# Patient Record
Sex: MALE | Race: OTHER | Employment: PART TIME | URBAN - METROPOLITAN AREA
[De-identification: names, ages, dates, MRNs, and addresses within clinical notes are randomized per-mention and may not be internally consistent; named-entity substitution may affect disease eponyms.]

---

## 2017-06-17 ENCOUNTER — HOSPITAL ENCOUNTER (EMERGENCY)
Age: 22
Discharge: HOME | End: 2017-06-17
Attending: EMERGENCY MEDICINE

## 2017-06-17 VITALS
RESPIRATION RATE: 18 BRPM | HEART RATE: 93 BPM | TEMPERATURE: 99.8 F | DIASTOLIC BLOOD PRESSURE: 66 MMHG | SYSTOLIC BLOOD PRESSURE: 113 MMHG | WEIGHT: 140 LBS | BODY MASS INDEX: 22.5 KG/M2 | OXYGEN SATURATION: 98 % | HEIGHT: 66 IN

## 2017-06-17 DIAGNOSIS — J01.10 ACUTE FRONTAL SINUSITIS, RECURRENCE NOT SPECIFIED: Primary | ICD-10-CM

## 2017-06-17 LAB
ALBUMIN SERPL-MCNC: 4.6 G/DL (ref 3.9–4.9)
ALP BLD-CCNC: 69 U/L (ref 35–104)
ALT SERPL-CCNC: 45 U/L (ref 0–41)
ANION GAP SERPL CALCULATED.3IONS-SCNC: 12 MEQ/L (ref 7–13)
AST SERPL-CCNC: 34 U/L (ref 0–40)
BASOPHILS ABSOLUTE: 0 K/UL (ref 0–0.2)
BASOPHILS RELATIVE PERCENT: 0.6 %
BILIRUB SERPL-MCNC: 0.4 MG/DL (ref 0–1.2)
BUN BLDV-MCNC: 14 MG/DL (ref 6–20)
CALCIUM SERPL-MCNC: 9 MG/DL (ref 8.6–10.2)
CHLORIDE BLD-SCNC: 102 MEQ/L (ref 98–107)
CO2: 26 MEQ/L (ref 22–29)
CREAT SERPL-MCNC: 1.03 MG/DL (ref 0.7–1.2)
EOSINOPHILS ABSOLUTE: 0.1 K/UL (ref 0–0.7)
EOSINOPHILS RELATIVE PERCENT: 1.7 %
GFR AFRICAN AMERICAN: >60
GFR NON-AFRICAN AMERICAN: >60
GLOBULIN: 2.5 G/DL (ref 2.3–3.5)
GLUCOSE BLD-MCNC: 97 MG/DL (ref 74–109)
HCT VFR BLD CALC: 44.2 % (ref 42–52)
HEMOGLOBIN: 14.6 G/DL (ref 14–18)
LYMPHOCYTES ABSOLUTE: 1.9 K/UL (ref 1–4.8)
LYMPHOCYTES RELATIVE PERCENT: 30.1 %
MCH RBC QN AUTO: 26.2 PG (ref 27–31.3)
MCHC RBC AUTO-ENTMCNC: 33 % (ref 33–37)
MCV RBC AUTO: 79.5 FL (ref 80–100)
MONO TEST: NEGATIVE
MONOCYTES ABSOLUTE: 0.8 K/UL (ref 0.2–0.8)
MONOCYTES RELATIVE PERCENT: 13.3 %
NEUTROPHILS ABSOLUTE: 3.4 K/UL (ref 1.4–6.5)
NEUTROPHILS RELATIVE PERCENT: 54.3 %
PDW BLD-RTO: 13.8 % (ref 11.5–14.5)
PLATELET # BLD: 198 K/UL (ref 130–400)
POTASSIUM SERPL-SCNC: 3.9 MEQ/L (ref 3.5–5.1)
RAPID INFLUENZA  B AGN: NEGATIVE
RAPID INFLUENZA A AGN: NEGATIVE
RBC # BLD: 5.57 M/UL (ref 4.7–6.1)
S PYO AG THROAT QL: NEGATIVE
SODIUM BLD-SCNC: 140 MEQ/L (ref 132–144)
TOTAL PROTEIN: 7.1 G/DL (ref 6.4–8.1)
WBC # BLD: 6.3 K/UL (ref 4.8–10.8)

## 2017-06-17 PROCEDURE — 36415 COLL VENOUS BLD VENIPUNCTURE: CPT

## 2017-06-17 PROCEDURE — 86308 HETEROPHILE ANTIBODY SCREEN: CPT

## 2017-06-17 PROCEDURE — 87040 BLOOD CULTURE FOR BACTERIA: CPT

## 2017-06-17 PROCEDURE — 87880 STREP A ASSAY W/OPTIC: CPT

## 2017-06-17 PROCEDURE — 87081 CULTURE SCREEN ONLY: CPT

## 2017-06-17 PROCEDURE — 2580000003 HC RX 258: Performed by: EMERGENCY MEDICINE

## 2017-06-17 PROCEDURE — 96375 TX/PRO/DX INJ NEW DRUG ADDON: CPT

## 2017-06-17 PROCEDURE — 99284 EMERGENCY DEPT VISIT MOD MDM: CPT

## 2017-06-17 PROCEDURE — 86403 PARTICLE AGGLUT ANTBDY SCRN: CPT

## 2017-06-17 PROCEDURE — 80053 COMPREHEN METABOLIC PANEL: CPT

## 2017-06-17 PROCEDURE — 6360000002 HC RX W HCPCS: Performed by: EMERGENCY MEDICINE

## 2017-06-17 PROCEDURE — 85025 COMPLETE CBC W/AUTO DIFF WBC: CPT

## 2017-06-17 PROCEDURE — 6370000000 HC RX 637 (ALT 250 FOR IP): Performed by: EMERGENCY MEDICINE

## 2017-06-17 PROCEDURE — 96374 THER/PROPH/DIAG INJ IV PUSH: CPT

## 2017-06-17 RX ORDER — 0.9 % SODIUM CHLORIDE 0.9 %
1000 INTRAVENOUS SOLUTION INTRAVENOUS ONCE
Status: COMPLETED | OUTPATIENT
Start: 2017-06-17 | End: 2017-06-17

## 2017-06-17 RX ORDER — KETOROLAC TROMETHAMINE 30 MG/ML
30 INJECTION, SOLUTION INTRAMUSCULAR; INTRAVENOUS ONCE
Status: COMPLETED | OUTPATIENT
Start: 2017-06-17 | End: 2017-06-17

## 2017-06-17 RX ORDER — AZITHROMYCIN 250 MG/1
TABLET, FILM COATED ORAL
Qty: 4 TABLET | Refills: 0 | Status: SHIPPED | OUTPATIENT
Start: 2017-06-17 | End: 2017-06-27

## 2017-06-17 RX ORDER — HYDROCODONE BITARTRATE AND ACETAMINOPHEN 5; 325 MG/1; MG/1
1 TABLET ORAL EVERY 6 HOURS PRN
Qty: 20 TABLET | Refills: 0 | Status: SHIPPED | OUTPATIENT
Start: 2017-06-17 | End: 2017-06-24

## 2017-06-17 RX ORDER — ACETAMINOPHEN 500 MG
1000 TABLET ORAL ONCE
Status: COMPLETED | OUTPATIENT
Start: 2017-06-17 | End: 2017-06-17

## 2017-06-17 RX ORDER — AZITHROMYCIN 500 MG/1
500 TABLET, FILM COATED ORAL ONCE
Status: COMPLETED | OUTPATIENT
Start: 2017-06-17 | End: 2017-06-17

## 2017-06-17 RX ORDER — ONDANSETRON 2 MG/ML
4 INJECTION INTRAMUSCULAR; INTRAVENOUS ONCE
Status: COMPLETED | OUTPATIENT
Start: 2017-06-17 | End: 2017-06-17

## 2017-06-17 RX ADMIN — AZITHROMYCIN 500 MG: 500 TABLET, FILM COATED ORAL at 22:04

## 2017-06-17 RX ADMIN — SODIUM CHLORIDE 1000 ML: 9 INJECTION, SOLUTION INTRAVENOUS at 20:57

## 2017-06-17 RX ADMIN — KETOROLAC TROMETHAMINE 30 MG: 30 INJECTION INTRAMUSCULAR; INTRAVENOUS at 20:57

## 2017-06-17 RX ADMIN — ACETAMINOPHEN 1000 MG: 500 TABLET ORAL at 20:57

## 2017-06-17 RX ADMIN — ONDANSETRON 4 MG: 2 INJECTION INTRAMUSCULAR; INTRAVENOUS at 20:57

## 2017-06-17 ASSESSMENT — ENCOUNTER SYMPTOMS
SORE THROAT: 0
PHOTOPHOBIA: 0
ABDOMINAL PAIN: 0
SINUS PRESSURE: 1
WHEEZING: 0
CHEST TIGHTNESS: 0
COUGH: 0
ABDOMINAL DISTENTION: 0
VOMITING: 0
SHORTNESS OF BREATH: 0
EYE DISCHARGE: 0

## 2017-06-17 ASSESSMENT — PAIN DESCRIPTION - LOCATION
LOCATION: HEAD
LOCATION: HEAD

## 2017-06-17 ASSESSMENT — PAIN DESCRIPTION - DESCRIPTORS
DESCRIPTORS: HEADACHE
DESCRIPTORS: ACHING

## 2017-06-17 ASSESSMENT — PAIN SCALES - GENERAL
PAINLEVEL_OUTOF10: 6
PAINLEVEL_OUTOF10: 5
PAINLEVEL_OUTOF10: 9
PAINLEVEL_OUTOF10: 9

## 2017-06-17 ASSESSMENT — PAIN DESCRIPTION - PAIN TYPE: TYPE: ACUTE PAIN

## 2017-06-20 LAB — S PYO THROAT QL CULT: NORMAL

## 2017-06-23 LAB
BLOOD CULTURE, ROUTINE: NORMAL
CULTURE, BLOOD 2: NORMAL

## 2018-02-13 ENCOUNTER — OFFICE VISIT (OUTPATIENT)
Dept: FAMILY MEDICINE CLINIC | Age: 23
End: 2018-02-13
Payer: COMMERCIAL

## 2018-02-13 VITALS
SYSTOLIC BLOOD PRESSURE: 110 MMHG | TEMPERATURE: 98.8 F | HEART RATE: 73 BPM | BODY MASS INDEX: 24.37 KG/M2 | OXYGEN SATURATION: 98 % | WEIGHT: 151 LBS | DIASTOLIC BLOOD PRESSURE: 80 MMHG

## 2018-02-13 DIAGNOSIS — B02.9 HERPES ZOSTER WITHOUT COMPLICATION: ICD-10-CM

## 2018-02-13 DIAGNOSIS — L30.9 ECZEMA, UNSPECIFIED TYPE: Primary | ICD-10-CM

## 2018-02-13 PROCEDURE — 1036F TOBACCO NON-USER: CPT | Performed by: NURSE PRACTITIONER

## 2018-02-13 PROCEDURE — G8484 FLU IMMUNIZE NO ADMIN: HCPCS | Performed by: NURSE PRACTITIONER

## 2018-02-13 PROCEDURE — 99213 OFFICE O/P EST LOW 20 MIN: CPT | Performed by: NURSE PRACTITIONER

## 2018-02-13 PROCEDURE — G8420 CALC BMI NORM PARAMETERS: HCPCS | Performed by: NURSE PRACTITIONER

## 2018-02-13 PROCEDURE — G8427 DOCREV CUR MEDS BY ELIG CLIN: HCPCS | Performed by: NURSE PRACTITIONER

## 2018-02-13 RX ORDER — TRIAMCINOLONE ACETONIDE 1 MG/G
CREAM TOPICAL
Qty: 45 G | Refills: 1 | Status: SHIPPED | OUTPATIENT
Start: 2018-02-13

## 2018-02-14 NOTE — PATIENT INSTRUCTIONS
Patient Education        Dermatitis: Instrucciones de cuidado - [ Dermatitis: Care Instructions ]  Instrucciones de cuidado  Dermatitis es el nombre general de cualquier salpullido o inflamación de la piel. Los distintos tipos de dermatitis causan diferentes tipos de salpullido. [address] causas comunes del salpullido se encuentran los medicamentos nuevos, las plantas (adama el zumaque venenoso o la hiedra venenosa), el calor y el estrés. Ciertas enfermedades también pueden causar un salpullido. Teri reacción alérgica a algo que toca la piel, adama el látex, el níquel o la hiedra venenosa, se llama dermatitis de contacto. La dermatitis de contacto también puede estar causada por algo que irrita la piel, adama la [place], teri sustancia química o el jabón. Bessie tipo de salpullido no se puede transmitir de Yarelis Bryce persona a otra. La duración del salpullido depende de schulte causa. El salpullido puede durar unos días o meses. La atención de seguimiento es teri parte clave de schulte tratamiento y seguridad. Asegúrese de hacer y acudir a todas las citas, y llame a schulte médico si está teniendo problemas. También es teri buena idea saber los resultados de shanell exámenes y mantener teri lista de los medicamentos que art. ¿Cómo puede cuidarse en el hogar? · No se rasque el salpullido. Mantenga las uñas cortas y [place]. O use guantes si esto le ayuda a no rascarse. · Lávese la key solo con agua. Seque la key con toques suaves de toalla. · Colóquese paños húmedos y fríos sobre el salpullido para reducir la comezón. · Manténgase fresco y evite el sol. · Deje el salpullido en contacto con el aire tanto adama sea posible. · Si el salpullido le da comezón, use crema de hidrocortisona. Siga las instrucciones de la etiqueta. La loción de calamina podría ayudar en el ibrahima del salpullido causado por plantas. · Tedrow un antihistamínico de venta [place], adama difenhidramina (Benadryl) o loratadina (Claritin), para aliviar la comezón.  Chika y [address] todas las indicaciones de la etiqueta. · Si schulte médico le recetó teri crema, úsela según las indicaciones. Si schulte médico le recetó un medicamento, tómelo exactamente según las indicaciones. ¿Cuándo debe pedir ayuda? Llame a schulte médico ahora mismo o busque atención médica inmediata si:  ? · Tiene síntomas de infección, tales adama:  ¨ Aumento del dolor, la hinchazón, la temperatura o el enrojecimiento. ¨ Vetas rojizas que salen de la key. ¨ Pus que sale de la key. Lucas Ralphs. ? · Tiene dolor en las articulaciones junto con el salpullido. ?Preste especial atención a los [facility], y asegúrese de comunicarse con schulte médico si:  ? · El salpullido está cambiando o empeorando. ? · No mejora adama se esperaba. ¿Dónde puede encontrar más información en inglés? Johnny Duran a [email or web address] e ingrese a schulte cuenta de [place] [address] en el [facility] \"Search Health Information\" para más información (en inglés) sobre \"Dermatitis: Instrucciones de cuidado - [ Dermatitis: Care Instructions ]. \"     Si no tiene teri cuenta, yuliana venus en el enlace \"Sign Up Now\". Revisado: 13 octubre, 2016  Versión del contenido: 11.5  © 1245-0589 [employer]. Las instrucciones de cuidado fueron adaptadas bajo licencia por [facility] ([facility]). Si usted tiene [place] afección médica o sobre estas instrucciones, siempre pregunte a schulte profesional de nicole. [employer] niega toda garantía o responsabilidad por schulte uso de esta información.

## 2018-02-17 LAB
FINAL REPORT: NORMAL
PRELIMINARY: NORMAL

## 2018-02-19 ENCOUNTER — TELEPHONE (OUTPATIENT)
Dept: FAMILY MEDICINE CLINIC | Age: 23
End: 2018-02-19

## 2024-07-02 ENCOUNTER — HOSPITAL ENCOUNTER (EMERGENCY)
Age: 29
Discharge: HOME | End: 2024-07-02
Attending: STUDENT IN AN ORGANIZED HEALTH CARE EDUCATION/TRAINING PROGRAM
Payer: COMMERCIAL

## 2024-07-02 ENCOUNTER — APPOINTMENT (OUTPATIENT)
Dept: GENERAL RADIOLOGY | Age: 29
End: 2024-07-02
Payer: COMMERCIAL

## 2024-07-02 VITALS
DIASTOLIC BLOOD PRESSURE: 70 MMHG | OXYGEN SATURATION: 97 % | HEART RATE: 85 BPM | RESPIRATION RATE: 28 BRPM | SYSTOLIC BLOOD PRESSURE: 119 MMHG | HEIGHT: 66 IN | TEMPERATURE: 97.9 F | WEIGHT: 160 LBS | BODY MASS INDEX: 25.71 KG/M2

## 2024-07-02 DIAGNOSIS — S68.629A PARTIAL TRAUMATIC AMPUTATION OF FINGER THROUGH PHALANX, INITIAL ENCOUNTER: Primary | ICD-10-CM

## 2024-07-02 DIAGNOSIS — S61.219A COMPLICATED LACERATION OF FINGER, INITIAL ENCOUNTER: ICD-10-CM

## 2024-07-02 DIAGNOSIS — S61.214A LACERATION OF RIGHT RING FINGER WITHOUT FOREIGN BODY WITHOUT DAMAGE TO NAIL, INITIAL ENCOUNTER: ICD-10-CM

## 2024-07-02 PROCEDURE — 96375 TX/PRO/DX INJ NEW DRUG ADDON: CPT

## 2024-07-02 PROCEDURE — 6360000002 HC RX W HCPCS: Performed by: STUDENT IN AN ORGANIZED HEALTH CARE EDUCATION/TRAINING PROGRAM

## 2024-07-02 PROCEDURE — 90715 TDAP VACCINE 7 YRS/> IM: CPT | Performed by: STUDENT IN AN ORGANIZED HEALTH CARE EDUCATION/TRAINING PROGRAM

## 2024-07-02 PROCEDURE — 12002 RPR S/N/AX/GEN/TRNK2.6-7.5CM: CPT

## 2024-07-02 PROCEDURE — 99284 EMERGENCY DEPT VISIT MOD MDM: CPT

## 2024-07-02 PROCEDURE — 96365 THER/PROPH/DIAG IV INF INIT: CPT

## 2024-07-02 PROCEDURE — 90471 IMMUNIZATION ADMIN: CPT | Performed by: STUDENT IN AN ORGANIZED HEALTH CARE EDUCATION/TRAINING PROGRAM

## 2024-07-02 PROCEDURE — 2580000003 HC RX 258: Performed by: STUDENT IN AN ORGANIZED HEALTH CARE EDUCATION/TRAINING PROGRAM

## 2024-07-02 PROCEDURE — 73130 X-RAY EXAM OF HAND: CPT

## 2024-07-02 RX ORDER — MORPHINE SULFATE 4 MG/ML
4 INJECTION, SOLUTION INTRAMUSCULAR; INTRAVENOUS ONCE
Status: COMPLETED | OUTPATIENT
Start: 2024-07-02 | End: 2024-07-02

## 2024-07-02 RX ORDER — OXYCODONE HYDROCHLORIDE AND ACETAMINOPHEN 5; 325 MG/1; MG/1
1 TABLET ORAL EVERY 6 HOURS PRN
Qty: 8 TABLET | Refills: 0 | Status: SHIPPED | OUTPATIENT
Start: 2024-07-02 | End: 2024-07-04

## 2024-07-02 RX ORDER — 0.9 % SODIUM CHLORIDE 0.9 %
1000 INTRAVENOUS SOLUTION INTRAVENOUS ONCE
Status: COMPLETED | OUTPATIENT
Start: 2024-07-02 | End: 2024-07-02

## 2024-07-02 RX ORDER — CEPHALEXIN 500 MG/1
500 CAPSULE ORAL 4 TIMES DAILY
Qty: 28 CAPSULE | Refills: 0 | Status: SHIPPED | OUTPATIENT
Start: 2024-07-02 | End: 2024-07-02

## 2024-07-02 RX ORDER — CEPHALEXIN 500 MG/1
500 CAPSULE ORAL 4 TIMES DAILY
Qty: 28 CAPSULE | Refills: 0 | Status: SHIPPED | OUTPATIENT
Start: 2024-07-02 | End: 2024-07-09

## 2024-07-02 RX ORDER — KETOROLAC TROMETHAMINE 30 MG/ML
30 INJECTION, SOLUTION INTRAMUSCULAR; INTRAVENOUS ONCE
Status: COMPLETED | OUTPATIENT
Start: 2024-07-02 | End: 2024-07-02

## 2024-07-02 RX ADMIN — MORPHINE SULFATE 4 MG: 4 INJECTION, SOLUTION INTRAMUSCULAR; INTRAVENOUS at 18:13

## 2024-07-02 RX ADMIN — SODIUM CHLORIDE 1000 ML: 9 INJECTION, SOLUTION INTRAVENOUS at 18:14

## 2024-07-02 RX ADMIN — CEFAZOLIN 2000 MG: 2 INJECTION, POWDER, FOR SOLUTION INTRAMUSCULAR; INTRAVENOUS at 18:12

## 2024-07-02 RX ADMIN — KETOROLAC TROMETHAMINE 30 MG: 30 INJECTION INTRAMUSCULAR; INTRAVENOUS at 20:03

## 2024-07-02 RX ADMIN — TETANUS TOXOID, REDUCED DIPHTHERIA TOXOID AND ACELLULAR PERTUSSIS VACCINE, ADSORBED 0.5 ML: 5; 2.5; 8; 8; 2.5 SUSPENSION INTRAMUSCULAR at 18:15

## 2024-07-02 ASSESSMENT — LIFESTYLE VARIABLES
HOW OFTEN DO YOU HAVE A DRINK CONTAINING ALCOHOL: NEVER
HOW MANY STANDARD DRINKS CONTAINING ALCOHOL DO YOU HAVE ON A TYPICAL DAY: PATIENT DOES NOT DRINK

## 2024-07-02 ASSESSMENT — ENCOUNTER SYMPTOMS: SHORTNESS OF BREATH: 0

## 2024-07-02 ASSESSMENT — PAIN SCALES - GENERAL
PAINLEVEL_OUTOF10: 10
PAINLEVEL_OUTOF10: 0

## 2024-07-02 ASSESSMENT — PAIN DESCRIPTION - PAIN TYPE: TYPE: ACUTE PAIN

## 2024-07-02 ASSESSMENT — PAIN - FUNCTIONAL ASSESSMENT
PAIN_FUNCTIONAL_ASSESSMENT: 0-10
PAIN_FUNCTIONAL_ASSESSMENT: NONE - DENIES PAIN

## 2024-07-02 ASSESSMENT — PAIN DESCRIPTION - LOCATION: LOCATION: HAND

## 2024-07-02 NOTE — ED PROVIDER NOTES
[facility] ED  Emergency Department Encounter  Emergency Medicine      Pt Name: Mikael Carballo  MRN:[id number]  Birthdate 1995  Date of evaluation: 7/2/24  PCP:  No primary care provider on file.    CHIEF COMPLAINT       Chief Complaint   Patient presents with    Finger Injury     Avulsion with lawnmover, 3rd and 4th digit of right hand       HISTORY OF PRESENT ILLNESS  (Location/Symptom, Timing/Onset, Context/Setting, Quality, Duration, ModifyingFactors, Severity.)      Mikael Carballo is a 28 y.o. male presents with finger injury.  He was fixing his lawn more and it was still running and he caught the top of his third and fourth digits of his right hand.  He is complaining of significant pain.  He did not take any medications prior to arrival, has no other complaints or injuries    PAST MEDICAL / SURGICAL / SOCIAL /FAMILY HISTORY      has no past medical history on file.  No other pertinent PMH on review with patient/guardian.     has a past surgical history that includes Appendectomy.  No other pertinent PSH on review with patient/guardian.  Social History     Socioeconomic History    Marital status:      Spouse name: Not on file    Number of children: Not on file    Years of education: Not on file    Highest education level: Not on file   Occupational History    Not on file   Tobacco Use    Smoking status: Never    Smokeless tobacco: Never   Substance and Sexual Activity    Alcohol use: No    Drug use: No    Sexual activity: Never   Other Topics Concern    Not on file   Social History Narrative    Not on file     Social Determinants of Health     Financial Resource Strain: Not on file   Food Insecurity: Not on file   Transportation Needs: Not on file   Physical Activity: Not on file   Stress: Not on file   Social Connections: Not on file   Intimate Partner Violence: Not on file   Housing Stability: Not on file       I counseled the patient against using tobacco products.    History

## 2024-07-02 NOTE — ED TRIAGE NOTES
Pt to ed from home via triage with hand injury from lawnmower  Pt has avulsion to fingertips on right hand, pads of 4th and 3rd diget, no skin flap noted, nail bed appears intact. Slow oozing blood noted, dressing place din triage  PT appears in great distress, hyperventilating at times, able to slow breathing with direction  Pt denies other injury, unsure of last tetanus

## 2024-07-02 NOTE — DISCHARGE INSTRUCTIONS
Go to the walk-in clinic to the address above between 8 AM and 4 PM tomorrow    Take Profen every 8 hours take ibuprofen 800 mg every 8 hours    Do not drive or operate heavy machinery while taking narcotics as it can make you drowsy    Take the antibiotics as prescribed to completion    Return for fevers, uncontrolled pain, numbness, body aches or chills, redness going up the arm, or other symptoms or concerns

## 2024-07-03 ENCOUNTER — OFFICE VISIT (OUTPATIENT)
Dept: ORTHOPEDIC SURGERY | Facility: CLINIC | Age: 29
End: 2024-07-03
Payer: MEDICAID

## 2024-07-03 VITALS — HEIGHT: 66 IN | BODY MASS INDEX: 24.11 KG/M2 | WEIGHT: 150 LBS

## 2024-07-03 DIAGNOSIS — S68.119A AMPUTATION OF FINGER TIP, INITIAL ENCOUNTER: ICD-10-CM

## 2024-07-03 PROCEDURE — 99213 OFFICE O/P EST LOW 20 MIN: CPT | Performed by: ORTHOPAEDIC SURGERY

## 2024-07-03 PROCEDURE — 99203 OFFICE O/P NEW LOW 30 MIN: CPT | Performed by: ORTHOPAEDIC SURGERY

## 2024-07-03 RX ORDER — CEPHALEXIN 500 MG/1
1 CAPSULE ORAL 4 TIMES DAILY
COMMUNITY
Start: 2024-07-02 | End: 2024-07-09

## 2024-07-03 RX ORDER — OXYCODONE AND ACETAMINOPHEN 5; 325 MG/1; MG/1
1 TABLET ORAL EVERY 8 HOURS PRN
Qty: 12 TABLET | Refills: 0 | Status: SHIPPED | OUTPATIENT
Start: 2024-07-03 | End: 2024-07-07

## 2024-07-03 RX ORDER — OXYCODONE AND ACETAMINOPHEN 5; 325 MG/1; MG/1
1 TABLET ORAL
COMMUNITY
Start: 2024-07-02 | End: 2024-07-04

## 2024-07-03 ASSESSMENT — PATIENT HEALTH QUESTIONNAIRE - PHQ9
1. LITTLE INTEREST OR PLEASURE IN DOING THINGS: NOT AT ALL
2. FEELING DOWN, DEPRESSED OR HOPELESS: NOT AT ALL
SUM OF ALL RESPONSES TO PHQ9 QUESTIONS 1 AND 2: 0

## 2024-07-03 NOTE — ED NOTES
DC instructions explained and reviewed. Pt demonstrates understanding. Wound care completed. No outward signs of acute distress noted at this time. Patient discharged with significant other.

## 2024-07-03 NOTE — PROGRESS NOTES
History: Isak is here for his right hand.  He was injured working on a lawnmower yesterday and seen in the [facility] ER.  He was found to have lacerations to the third and fourth fingertips.  Wounds were cleaned and suture approximated.  He was placed on antibiotics.    Past medical history: Multiple  Medications: Multiple  Allergies: No known drug allergies    Please refer to the intake H&P regarding the patient's review of systems, family history and social history as was done today    HEENT: Normal  Lungs: Clear to auscultation  Heart: RRR  Abdomen: Soft, nontender  Skin: clear  Extremity: He has soft tissue injuries to the volar aspect of the third digit mainly but also a small fourth finger laceration.  There are sutures in place with some exposed pulp tissue.  No exposed bone.  He can flex and extend the IP joint of both digits.  Slight numbness around the laceration sites.  Otherwise wounds are clear without redness.  Contralateral exam is normal for strength, motion, stability and neurovascular assessment.    Radiographs: X-rays show no obvious bony injury.    Assessment: Right third and fourth fingertip soft tissue injury from a lawnmower accident    Plan: We discussed several options including digit shortening versus allowing for secondary healing.  He would prefer to keep his fingertip length possible and is understanding that we will take at least 6 to 8 weeks for healing.  He is going to start daily soaks as well as dressing changes as instructed today.  He will take his antibiotics as directed.  He was given a small prescription for Percocet as well.  We will see him next week for recheck and can consider suture removal if needed.  All questions were answered today with the patient.    This note was generated with voice recognition software and may contain grammatical errors.

## 2024-07-10 ENCOUNTER — OFFICE VISIT (OUTPATIENT)
Dept: ORTHOPEDIC SURGERY | Facility: CLINIC | Age: 29
End: 2024-07-10
Payer: MEDICAID

## 2024-07-10 ENCOUNTER — APPOINTMENT (OUTPATIENT)
Dept: ORTHOPEDIC SURGERY | Facility: CLINIC | Age: 29
End: 2024-07-10
Payer: MEDICAID

## 2024-07-10 DIAGNOSIS — S68.119A AMPUTATION OF FINGER TIP, INITIAL ENCOUNTER: Primary | ICD-10-CM

## 2024-07-10 PROCEDURE — 99213 OFFICE O/P EST LOW 20 MIN: CPT | Performed by: ORTHOPAEDIC SURGERY

## 2024-07-10 NOTE — PROGRESS NOTES
History: Isak is here for his right hand.  He had a lawnmower injury over a week ago and been doing daily wound care for his fingertip injuries.  He feels he is starting to do better.  He is now done with his antibiotics.    Past medical history: Multiple  Medications: Multiple  Allergies: No known drug allergies    Please refer to the intake H&P regarding the patient's review of systems, family history and social history as was done today    HEENT: Normal  Lungs: Clear to auscultation  Heart: RRR  Abdomen: Soft, nontender  Skin: clear  Extremity: Eschar is in place at the tip of the third and fourth fingers.  No sign of any redness or streaking.  He can flex gently with only mild pain.  There is slight tenderness to palpation.  Again no rotational deformity.  FDP FDS and essential function remains intact.  Contralateral exam is normal for strength, motion, stability and neurovascular assessment.    Radiographs: None today.  Previous x-rays were negative.    Assessment: Healing right third and fourth fingertip soft tissue injuries from a lawnmower accident    Plan: We did remove his sutures today.  He is going to continue with daily wound care with half percent peroxide soaks for 5 minutes daily followed by showering.  He can then use a new dressing if needed.  He can also let the wounds dry out to air if he is not in contact with other objects.  He is going to follow-up in 3 to 4 weeks for recheck of the wounds at that time.  He understands will be several months for full healing.  All questions were answered today with the patient.    This note was generated with voice recognition software and may contain grammatical errors.   To get better and follow your care plan as instructed.

## 2024-07-31 ENCOUNTER — APPOINTMENT (OUTPATIENT)
Dept: ORTHOPEDIC SURGERY | Facility: CLINIC | Age: 29
End: 2024-07-31
Payer: MEDICAID

## 2025-01-19 ENCOUNTER — APPOINTMENT (OUTPATIENT)
Dept: RADIOLOGY | Facility: HOSPITAL | Age: 30
End: 2025-01-19
Payer: MEDICAID

## 2025-01-19 ENCOUNTER — HOSPITAL ENCOUNTER (EMERGENCY)
Facility: HOSPITAL | Age: 30
Discharge: HOME | End: 2025-01-20
Payer: MEDICAID

## 2025-01-19 DIAGNOSIS — S82.392A CLOSED FRACTURE OF POSTERIOR MALLEOLUS OF LEFT TIBIA, INITIAL ENCOUNTER: Primary | ICD-10-CM

## 2025-01-19 PROCEDURE — 73630 X-RAY EXAM OF FOOT: CPT | Mod: LEFT SIDE | Performed by: RADIOLOGY

## 2025-01-19 PROCEDURE — 2500000004 HC RX 250 GENERAL PHARMACY W/ HCPCS (ALT 636 FOR OP/ED)

## 2025-01-19 PROCEDURE — 73630 X-RAY EXAM OF FOOT: CPT | Mod: LT

## 2025-01-19 PROCEDURE — 73590 X-RAY EXAM OF LOWER LEG: CPT | Mod: LT

## 2025-01-19 PROCEDURE — 96372 THER/PROPH/DIAG INJ SC/IM: CPT

## 2025-01-19 PROCEDURE — 73590 X-RAY EXAM OF LOWER LEG: CPT | Mod: LEFT SIDE | Performed by: RADIOLOGY

## 2025-01-19 PROCEDURE — 73700 CT LOWER EXTREMITY W/O DYE: CPT | Mod: LT

## 2025-01-19 PROCEDURE — 2500000001 HC RX 250 WO HCPCS SELF ADMINISTERED DRUGS (ALT 637 FOR MEDICARE OP)

## 2025-01-19 PROCEDURE — 99284 EMERGENCY DEPT VISIT MOD MDM: CPT

## 2025-01-19 PROCEDURE — 29515 APPLICATION SHORT LEG SPLINT: CPT | Mod: LT

## 2025-01-19 PROCEDURE — 73610 X-RAY EXAM OF ANKLE: CPT | Mod: LEFT SIDE | Performed by: RADIOLOGY

## 2025-01-19 PROCEDURE — 73610 X-RAY EXAM OF ANKLE: CPT | Mod: LT

## 2025-01-19 RX ORDER — ACETAMINOPHEN 325 MG/1
975 TABLET ORAL ONCE
Status: COMPLETED | OUTPATIENT
Start: 2025-01-19 | End: 2025-01-19

## 2025-01-19 RX ORDER — KETOROLAC TROMETHAMINE 30 MG/ML
15 INJECTION, SOLUTION INTRAMUSCULAR; INTRAVENOUS ONCE
Status: COMPLETED | OUTPATIENT
Start: 2025-01-19 | End: 2025-01-19

## 2025-01-19 RX ADMIN — ACETAMINOPHEN 975 MG: 325 TABLET, FILM COATED ORAL at 21:49

## 2025-01-19 RX ADMIN — KETOROLAC TROMETHAMINE 15 MG: 30 INJECTION, SOLUTION INTRAMUSCULAR at 21:49

## 2025-01-19 ASSESSMENT — PAIN DESCRIPTION - DESCRIPTORS: DESCRIPTORS: ACHING

## 2025-01-19 ASSESSMENT — PAIN SCALES - GENERAL
PAINLEVEL_OUTOF10: 10 - WORST POSSIBLE PAIN
PAINLEVEL_OUTOF10: 9

## 2025-01-19 ASSESSMENT — COLUMBIA-SUICIDE SEVERITY RATING SCALE - C-SSRS
1. IN THE PAST MONTH, HAVE YOU WISHED YOU WERE DEAD OR WISHED YOU COULD GO TO SLEEP AND NOT WAKE UP?: NO
2. HAVE YOU ACTUALLY HAD ANY THOUGHTS OF KILLING YOURSELF?: NO
6. HAVE YOU EVER DONE ANYTHING, STARTED TO DO ANYTHING, OR PREPARED TO DO ANYTHING TO END YOUR LIFE?: NO

## 2025-01-19 ASSESSMENT — PAIN DESCRIPTION - PAIN TYPE: TYPE: ACUTE PAIN

## 2025-01-19 ASSESSMENT — PAIN DESCRIPTION - LOCATION: LOCATION: ANKLE

## 2025-01-19 ASSESSMENT — PAIN - FUNCTIONAL ASSESSMENT: PAIN_FUNCTIONAL_ASSESSMENT: 0-10

## 2025-01-20 ENCOUNTER — OFFICE VISIT (OUTPATIENT)
Dept: ORTHOPEDIC SURGERY | Facility: CLINIC | Age: 30
End: 2025-01-20
Payer: MEDICAID

## 2025-01-20 VITALS
DIASTOLIC BLOOD PRESSURE: 70 MMHG | TEMPERATURE: 98 F | OXYGEN SATURATION: 99 % | WEIGHT: 160 LBS | BODY MASS INDEX: 25.71 KG/M2 | SYSTOLIC BLOOD PRESSURE: 122 MMHG | RESPIRATION RATE: 16 BRPM | HEART RATE: 84 BPM | HEIGHT: 66 IN

## 2025-01-20 DIAGNOSIS — S82.392A CLOSED FRACTURE OF POSTERIOR MALLEOLUS OF LEFT TIBIA, INITIAL ENCOUNTER: Primary | ICD-10-CM

## 2025-01-20 PROCEDURE — 2500000001 HC RX 250 WO HCPCS SELF ADMINISTERED DRUGS (ALT 637 FOR MEDICARE OP)

## 2025-01-20 PROCEDURE — 1036F TOBACCO NON-USER: CPT | Performed by: STUDENT IN AN ORGANIZED HEALTH CARE EDUCATION/TRAINING PROGRAM

## 2025-01-20 PROCEDURE — 27767 CLTX POST ANKLE FX: CPT | Performed by: STUDENT IN AN ORGANIZED HEALTH CARE EDUCATION/TRAINING PROGRAM

## 2025-01-20 PROCEDURE — 99214 OFFICE O/P EST MOD 30 MIN: CPT | Performed by: STUDENT IN AN ORGANIZED HEALTH CARE EDUCATION/TRAINING PROGRAM

## 2025-01-20 RX ORDER — OXYCODONE AND ACETAMINOPHEN 5; 325 MG/1; MG/1
1 TABLET ORAL ONCE
Status: COMPLETED | OUTPATIENT
Start: 2025-01-20 | End: 2025-01-20

## 2025-01-20 RX ORDER — ACETAMINOPHEN 500 MG
1000 TABLET ORAL EVERY 8 HOURS PRN
Qty: 18 TABLET | Refills: 0 | Status: SHIPPED | OUTPATIENT
Start: 2025-01-20 | End: 2025-01-23

## 2025-01-20 RX ORDER — IBUPROFEN 600 MG/1
600 TABLET ORAL EVERY 6 HOURS PRN
Qty: 25 TABLET | Refills: 0 | Status: SHIPPED | OUTPATIENT
Start: 2025-01-20 | End: 2025-01-27

## 2025-01-20 RX ADMIN — OXYCODONE HYDROCHLORIDE AND ACETAMINOPHEN 1 TABLET: 5; 325 TABLET ORAL at 01:56

## 2025-01-20 ASSESSMENT — PAIN - FUNCTIONAL ASSESSMENT: PAIN_FUNCTIONAL_ASSESSMENT: 0-10

## 2025-01-20 ASSESSMENT — PAIN SCALES - GENERAL: PAINLEVEL_OUTOF10: 5 - MODERATE PAIN

## 2025-01-20 NOTE — ED PROVIDER NOTES
HPI   Chief Complaint   Patient presents with    Ankle Pain     left       29-year-old male presents to the ED today with a chief concern of left ankle injury.  Patient reports that he was running out in the snow trying to get into the car quickly when he injured his left ankle.  He reports the pain is located at the site throughout the left ankle without radiation.  He describes the pain as aching worse with walking.  He has been having difficulty walking since the event.  This happened just prior to arrival.  He did not hit his head or lose consciousness.  Is not anticoagulated.  Did not sustain any other injuries.  He is accompanied by his brother-in-law.  He reports a little bit of tingling in the left foot however he started to feel the area.  He has no other symptoms or concerns at this time.      History provided by:  Patient and relative   used: No            Patient History   History reviewed. No pertinent past medical history.  History reviewed. No pertinent surgical history.  No family history on file.  Social History     Tobacco Use    Smoking status: Never    Smokeless tobacco: Never   Substance Use Topics    Alcohol use: Not on file    Drug use: Not on file       Physical Exam   ED Triage Vitals [01/19/25 2023]   Temperature Heart Rate Respirations BP   36.7 °C (98 °F) 91 18 120/69      Pulse Ox Temp Source Heart Rate Source Patient Position   98 % Temporal Monitor Sitting      BP Location FiO2 (%)     Left arm --       Physical Exam  Constitutional: Vital signs per nursing notes.  Well developed, well nourished.  No acute distress.    Eyes:  conjunctivae and lids normal  ENT: Ears normal externally; face symmetric. voice normal. no hemotympanum.  Negative Pérez sign.  No raccoon eyes.  Neck: neck supple, no meningismus; trachea midline without deviation  Respiratory: normal respiratory effort and excursion; no rales, rhonchi, or wheezes; equal air entry  Cardiovascular: RRR, 2+  pulses extremities   Neurological: normal speech; CN II-XII grossly intact, normal motor and sensory function  GI: no distention, soft, nontender  : Deferred  Musculoskeletal: ambulates with painnormal digits and nails; decreased range of motion of left ankle due to pain.  No tenderness over the left medial or lateral malleolus.  There is mild tenderness over the anterior left ankle and foot.  No tenderness over the base of fifth metatarsal on left lower extremity.  The left Achilles tendon is intact.  No tenderness over the left tib-fib and knee including annular head.  4/5 strength in left ankle due to pain.  5/5 strength in remainder of left lower extremity.  5/5 strength in right lower extremity.  Sensation intact in lower extremities bilaterally.  2+ symmetric dorsalis pedis pulses and posterior tibial pulses.  Compartments are soft.  No cyanosis.   Skin: normal to inspection; normal to palpation; no rash      ED Course & [facility]   ED Course as of 01/20/25 0418   Sun Jan 19, 2025   2200 I personally interpreted x-ray of the left tib-fib, foot, and ankle which showed no obvious fracture.  Final disposition and treatment pending radiology read []   2338 IMPRESSION:  1. Acute minimally displaced posterior malleolar tibial fracture.      Signed by: Scar Agrawal 1/19/2025 11:36 PM  Dictation workstation:   [id number]   []   Mon Jan 20, 2025   0154 Spoke with orthopedics resident Dr. cabrera who recommends posterior short leg splint and stirrup and follow-up outpatient  []   0156 Patient has a ride home today will not be driving himself home []   0159 Patient was placed in a posterior short leg and stirrup splint by nursing staff.  I evaluated this after placement neurovascular status intact []      ED Course User Index  [] Phillip Barrera PA-C         Diagnoses as of 01/20/25 0418   Closed fracture of posterior malleolus of left tibia, initial encounter                 No data recorded                                  Medical Decision Making  29-year-old male presents to the ED today with a chief concern of left ankle injury.  Vital signs reassuring.  Patient overall appears well and is nontoxic-appearing. Patient has full range of motion of the neck without meningismus.  Satting well on room air.  Not hypoxic.  Not tachycardic.  Afebrile.  X-ray of the left ankle, foot, and tib-fib show possible fracture.  They recommended CT.  CT of the left ankle shows acute minimally displaced posterior malleoli or tibial fracture.  I spoke with orthopedics as noted above who recommended placing patient in a posterior short leg and stirrup splint.  Nursing staff placed patient in a posterior short leg and stirrup splint.  I evaluated this after placement neurovascular status intact.  Patient was given crutches.  He has a ride home today will not be driving himself home.  Was given Percocet, Tylenol, and Toradol in the ED.  He is freely moving the remainder of extremities without any difficulty.  He has no tenderness over the proximal left tib-fib and knee including fibular head.  The fracture is closed.  Neurovascular status intact.  Compartments are soft.  There is no concern for compartment syndrome.  Discussed impression and findings with patient he feels comfortable returning home.  We discussed very strict precautions including returning for any new or worsening signs.  Patient is in agreement with this plan.  He will follow-up with the PCP and orthopedics within 3 days.  Discussed nonweightbearing status.    Differential diagnosis: Ligamentous injury, fracture, dislocation, abscess, cellulitis, lymphangitis, DVT, acute arterial occlusion, necrotizing fasciitis, arthritis, crystal arthropathy, septic arthritis, tendon rupture    Disposition/treatment  1.  See diagnosis    Shared decision-making was used patient feels comfortable returning home     Patient was advised to follow up with recommended provider in 1 day  for another evaluation and exam. I advised patient/guardian to return or go to closest emergency room immediately if symptoms change, get worse, new symptoms develop prior to follow up. If there is no improvement in symptoms in the next 24 hours they are advised to return for further evaluation and exam. I also explained the plan and treatment course. Patient/guardian is in agreement with plan, treatment course, and follow up and states verbally that they will comply.    Patient is homegoing. I discussed the differential; results and discharge plan with the patient and/or family/friend/caregiver if present.  I emphasized the importance of follow-up with the physician I referred them to in the timeframe recommended.  I explained reasons for the patient to return to the Emergency Department. They agreed that if they feel their condition is worsening or if they have any other concern they should call 911 immediately for further assistance. I gave the patient an opportunity to ask all questions they had and answered all of them accordingly. They understand return precautions and discharge instructions. The patient and/or family/friend/caregiver expressed understanding verbally and that they would comply.        This note has been transcribed using voice recognition and may contain grammatical errors, misplaced words, incorrect words, incorrect phrases or other errors.        Procedure  Procedures     Phillip Barrera PA-C  01/20/25 0419

## 2025-01-20 NOTE — PROGRESS NOTES
Acute Injury Established Patient Visit    HPI: Isak is a 29 y.o.male who presents today with new complaints of left ankle injury.  Unfortunately, last night he slipped on the ice and rolled his ankle.  He was seen at the [facility] emergency department.  Had a CT of the ankle which showed an isolated posterior malleolus fracture that is nondisplaced.  Denies any knee pain or swelling.  Denies any numbness tingling.  Was placed in a posterior splint and on crutches.    Plan: For this left posterior malleolus nondisplaced fracture of the left tibia, we will place him in a short leg cast to be nonweightbearing for 2 weeks, and follow-up at that time with repeat x-rays of the left ankle.  As long as he is healing well, can likely transition him into a boot at that time for some advancement of weightbearing, protected.  Continue with conservative treat measures especially elevation, but most importantly nonweightbearing status for the next 2 weeks.  He understands and agrees with plan.    Assessment:   Problem List Items Addressed This Visit    None  Visit Diagnoses       Closed fracture of posterior malleolus of left tibia, initial encounter    -  Primary    Relevant Orders    Walking Boot Tall            Diagnostics: Reviewed all relevant imaging including x-ray, MRI, CT, and US.      Procedure:  Procedures    Physical Exam:  GENERAL:  No obvious acute distress.  NEURO:  Distally neurovascularly intact.  Sensation intact to light touch.  Extremity: Left ankle exam shows:  Skin is intact;  No erythema or warmth;  Mild to moderate amount of swelling about the ankle with no significant bruising;  No clinical signs of infection;  No pain over the lateral malleolus;  TENDER over the medial malleolus;  No pain over the ATF, CF or PTF ligaments;  No pain over the deltoid ligament;  No pain over the Achilles tendon;  Negative Peng's test;  Negative squeeze test;  Negative anterior drawer test;  Negative talar tilt  test;  No pain over the anterior process of the talus;  No pain over the talar dome;  No pain over the base of the fifth metatarsal bone;  No pain over the calcaneus;  No pain over the plantar aponeurosis;  No pain of the midfoot; and  Neurovascularly intact.    Orders Placed This Encounter    Walking Boot Tall      At the conclusion of the visit there were no further questions by the patient/family regarding their plan of care.  Patient was instructed to call or return with any issues, questions, or concerns regarding their injury and/or treatment plan described above.     01/20/25 at 2:39 PM - Tal Lomas,     Office: [phone]    This note was prepared using voice recognition software.  The details of this note are correct and have been reviewed, and corrected to the best of my ability.  Some grammatical errors may persist related to the Dragon software.

## 2025-02-05 ENCOUNTER — APPOINTMENT (OUTPATIENT)
Dept: CARDIOLOGY | Facility: HOSPITAL | Age: 30
End: 2025-02-05
Payer: MEDICAID

## 2025-02-05 ENCOUNTER — HOSPITAL ENCOUNTER (EMERGENCY)
Facility: HOSPITAL | Age: 30
Discharge: HOME | End: 2025-02-05
Attending: EMERGENCY MEDICINE
Payer: MEDICAID

## 2025-02-05 ENCOUNTER — APPOINTMENT (OUTPATIENT)
Dept: RADIOLOGY | Facility: HOSPITAL | Age: 30
End: 2025-02-05
Payer: MEDICAID

## 2025-02-05 ENCOUNTER — HOSPITAL ENCOUNTER (OUTPATIENT)
Dept: RADIOLOGY | Facility: HOSPITAL | Age: 30
Discharge: HOME | End: 2025-02-05
Payer: MEDICAID

## 2025-02-05 ENCOUNTER — APPOINTMENT (OUTPATIENT)
Dept: ORTHOPEDIC SURGERY | Facility: CLINIC | Age: 30
End: 2025-02-05
Payer: MEDICAID

## 2025-02-05 VITALS
HEIGHT: 66 IN | DIASTOLIC BLOOD PRESSURE: 67 MMHG | WEIGHT: 150 LBS | TEMPERATURE: 97.7 F | RESPIRATION RATE: 16 BRPM | HEART RATE: 82 BPM | BODY MASS INDEX: 24.11 KG/M2 | SYSTOLIC BLOOD PRESSURE: 140 MMHG | OXYGEN SATURATION: 97 %

## 2025-02-05 DIAGNOSIS — M79.662 PAIN OF LEFT CALF: ICD-10-CM

## 2025-02-05 DIAGNOSIS — S82.392A CLOSED FRACTURE OF POSTERIOR MALLEOLUS OF LEFT TIBIA, INITIAL ENCOUNTER: ICD-10-CM

## 2025-02-05 DIAGNOSIS — I82.4Y2 ACUTE DEEP VEIN THROMBOSIS (DVT) OF PROXIMAL VEIN OF LEFT LOWER EXTREMITY (MULTI): Primary | ICD-10-CM

## 2025-02-05 DIAGNOSIS — S82.392A CLOSED FRACTURE OF POSTERIOR MALLEOLUS OF LEFT TIBIA, INITIAL ENCOUNTER: Primary | ICD-10-CM

## 2025-02-05 LAB
ALBUMIN SERPL BCP-MCNC: 4.9 G/DL (ref 3.4–5)
ALP SERPL-CCNC: 62 U/L (ref 33–120)
ALT SERPL W P-5'-P-CCNC: 29 U/L (ref 10–52)
ANION GAP SERPL CALC-SCNC: 12 MMOL/L (ref 10–20)
APTT PPP: 35 SECONDS (ref 27–38)
AST SERPL W P-5'-P-CCNC: 21 U/L (ref 9–39)
ATRIAL RATE: 74 BPM
ATRIAL RATE: 83 BPM
BASOPHILS # BLD AUTO: 0.07 X10*3/UL (ref 0–0.1)
BASOPHILS NFR BLD AUTO: 0.9 %
BILIRUB SERPL-MCNC: 0.5 MG/DL (ref 0–1.2)
BNP SERPL-MCNC: 14 PG/ML (ref 0–99)
BUN SERPL-MCNC: 17 MG/DL (ref 6–23)
CALCIUM SERPL-MCNC: 9.9 MG/DL (ref 8.6–10.3)
CARDIAC TROPONIN I PNL SERPL HS: 3 NG/L (ref 0–20)
CARDIAC TROPONIN I PNL SERPL HS: 3 NG/L (ref 0–20)
CHLORIDE SERPL-SCNC: 102 MMOL/L (ref 98–107)
CO2 SERPL-SCNC: 28 MMOL/L (ref 21–32)
CREAT SERPL-MCNC: 0.85 MG/DL (ref 0.5–1.3)
EGFRCR SERPLBLD CKD-EPI 2021: >90 ML/MIN/1.73M*2
EOSINOPHIL # BLD AUTO: 0.15 X10*3/UL (ref 0–0.7)
EOSINOPHIL NFR BLD AUTO: 2 %
ERYTHROCYTE [DISTWIDTH] IN BLOOD BY AUTOMATED COUNT: 13.6 % (ref 11.5–14.5)
GLUCOSE SERPL-MCNC: 91 MG/DL (ref 74–99)
HCT VFR BLD AUTO: 45.5 % (ref 41–52)
HGB BLD-MCNC: 15.2 G/DL (ref 13.5–17.5)
IMM GRANULOCYTES # BLD AUTO: 0.04 X10*3/UL (ref 0–0.7)
IMM GRANULOCYTES NFR BLD AUTO: 0.5 % (ref 0–0.9)
INR PPP: 1.1 (ref 0.9–1.1)
LACTATE SERPL-SCNC: 1.1 MMOL/L (ref 0.4–2)
LYMPHOCYTES # BLD AUTO: 2.15 X10*3/UL (ref 1.2–4.8)
LYMPHOCYTES NFR BLD AUTO: 28.3 %
MAGNESIUM SERPL-MCNC: 1.9 MG/DL (ref 1.6–2.4)
MCH RBC QN AUTO: 26.8 PG (ref 26–34)
MCHC RBC AUTO-ENTMCNC: 33.4 G/DL (ref 32–36)
MCV RBC AUTO: 80 FL (ref 80–100)
MONOCYTES # BLD AUTO: 0.68 X10*3/UL (ref 0.1–1)
MONOCYTES NFR BLD AUTO: 8.9 %
NEUTROPHILS # BLD AUTO: 4.52 X10*3/UL (ref 1.2–7.7)
NEUTROPHILS NFR BLD AUTO: 59.4 %
NRBC BLD-RTO: 0 /100 WBCS (ref 0–0)
P AXIS: 55 DEGREES
P AXIS: 65 DEGREES
P OFFSET: 194 MS
P OFFSET: 200 MS
P ONSET: 137 MS
P ONSET: 147 MS
PLATELET # BLD AUTO: 259 X10*3/UL (ref 150–450)
POTASSIUM SERPL-SCNC: 3.9 MMOL/L (ref 3.5–5.3)
PR INTERVAL: 140 MS
PR INTERVAL: 158 MS
PROT SERPL-MCNC: 7.7 G/DL (ref 6.4–8.2)
PROTHROMBIN TIME: 12.2 SECONDS (ref 9.8–12.8)
Q ONSET: 216 MS
Q ONSET: 217 MS
QRS COUNT: 13 BEATS
QRS COUNT: 13 BEATS
QRS DURATION: 98 MS
QRS DURATION: 98 MS
QT INTERVAL: 360 MS
QT INTERVAL: 374 MS
QTC CALCULATION(BAZETT): 415 MS
QTC CALCULATION(BAZETT): 423 MS
QTC FREDERICIA: 401 MS
QTC FREDERICIA: 401 MS
R AXIS: 72 DEGREES
R AXIS: 97 DEGREES
RBC # BLD AUTO: 5.68 X10*6/UL (ref 4.5–5.9)
SODIUM SERPL-SCNC: 138 MMOL/L (ref 136–145)
T AXIS: -4 DEGREES
T AXIS: 53 DEGREES
T OFFSET: 397 MS
T OFFSET: 403 MS
VENTRICULAR RATE: 74 BPM
VENTRICULAR RATE: 83 BPM
WBC # BLD AUTO: 7.6 X10*3/UL (ref 4.4–11.3)

## 2025-02-05 PROCEDURE — 2500000004 HC RX 250 GENERAL PHARMACY W/ HCPCS (ALT 636 FOR OP/ED): Performed by: EMERGENCY MEDICINE

## 2025-02-05 PROCEDURE — 96365 THER/PROPH/DIAG IV INF INIT: CPT

## 2025-02-05 PROCEDURE — 83735 ASSAY OF MAGNESIUM: CPT | Performed by: EMERGENCY MEDICINE

## 2025-02-05 PROCEDURE — 93971 EXTREMITY STUDY: CPT

## 2025-02-05 PROCEDURE — 84075 ASSAY ALKALINE PHOSPHATASE: CPT | Performed by: EMERGENCY MEDICINE

## 2025-02-05 PROCEDURE — 85025 COMPLETE CBC W/AUTO DIFF WBC: CPT | Performed by: EMERGENCY MEDICINE

## 2025-02-05 PROCEDURE — 96366 THER/PROPH/DIAG IV INF ADDON: CPT

## 2025-02-05 PROCEDURE — 83605 ASSAY OF LACTIC ACID: CPT | Performed by: EMERGENCY MEDICINE

## 2025-02-05 PROCEDURE — L4361 PNEUMA/VAC WALK BOOT PRE OTS: HCPCS | Performed by: STUDENT IN AN ORGANIZED HEALTH CARE EDUCATION/TRAINING PROGRAM

## 2025-02-05 PROCEDURE — 93005 ELECTROCARDIOGRAM TRACING: CPT | Mod: 59

## 2025-02-05 PROCEDURE — 71045 X-RAY EXAM CHEST 1 VIEW: CPT

## 2025-02-05 PROCEDURE — 93971 EXTREMITY STUDY: CPT | Performed by: RADIOLOGY

## 2025-02-05 PROCEDURE — 2550000001 HC RX 255 CONTRASTS: Performed by: EMERGENCY MEDICINE

## 2025-02-05 PROCEDURE — 99291 CRITICAL CARE FIRST HOUR: CPT | Performed by: EMERGENCY MEDICINE

## 2025-02-05 PROCEDURE — 36415 COLL VENOUS BLD VENIPUNCTURE: CPT | Performed by: EMERGENCY MEDICINE

## 2025-02-05 PROCEDURE — 83880 ASSAY OF NATRIURETIC PEPTIDE: CPT | Performed by: EMERGENCY MEDICINE

## 2025-02-05 PROCEDURE — 99024 POSTOP FOLLOW-UP VISIT: CPT | Performed by: STUDENT IN AN ORGANIZED HEALTH CARE EDUCATION/TRAINING PROGRAM

## 2025-02-05 PROCEDURE — 85610 PROTHROMBIN TIME: CPT | Performed by: EMERGENCY MEDICINE

## 2025-02-05 PROCEDURE — 85730 THROMBOPLASTIN TIME PARTIAL: CPT | Performed by: EMERGENCY MEDICINE

## 2025-02-05 PROCEDURE — 71275 CT ANGIOGRAPHY CHEST: CPT

## 2025-02-05 PROCEDURE — 84484 ASSAY OF TROPONIN QUANT: CPT | Performed by: EMERGENCY MEDICINE

## 2025-02-05 PROCEDURE — 73610 X-RAY EXAM OF ANKLE: CPT | Mod: LT

## 2025-02-05 RX ORDER — HEPARIN SODIUM 10000 [USP'U]/100ML
0-4500 INJECTION, SOLUTION INTRAVENOUS CONTINUOUS
Status: DISCONTINUED | OUTPATIENT
Start: 2025-02-05 | End: 2025-02-05

## 2025-02-05 RX ORDER — HYDROCODONE BITARTRATE AND ACETAMINOPHEN 5; 325 MG/1; MG/1
1 TABLET ORAL EVERY 4 HOURS PRN
Qty: 10 TABLET | Refills: 0 | Status: SHIPPED | OUTPATIENT
Start: 2025-02-05 | End: 2025-02-08

## 2025-02-05 RX ADMIN — IOHEXOL 75 ML: 350 INJECTION, SOLUTION INTRAVENOUS at 14:29

## 2025-02-05 RX ADMIN — HEPARIN SODIUM 1200 UNITS/HR: 10000 INJECTION, SOLUTION INTRAVENOUS at 12:42

## 2025-02-05 ASSESSMENT — PAIN DESCRIPTION - LOCATION: LOCATION: LEG

## 2025-02-05 ASSESSMENT — COLUMBIA-SUICIDE SEVERITY RATING SCALE - C-SSRS
2. HAVE YOU ACTUALLY HAD ANY THOUGHTS OF KILLING YOURSELF?: NO
6. HAVE YOU EVER DONE ANYTHING, STARTED TO DO ANYTHING, OR PREPARED TO DO ANYTHING TO END YOUR LIFE?: NO
1. IN THE PAST MONTH, HAVE YOU WISHED YOU WERE DEAD OR WISHED YOU COULD GO TO SLEEP AND NOT WAKE UP?: NO

## 2025-02-05 ASSESSMENT — PAIN SCALES - GENERAL
PAINLEVEL_OUTOF10: 8
PAINLEVEL_OUTOF10: 8

## 2025-02-05 ASSESSMENT — PAIN DESCRIPTION - ORIENTATION: ORIENTATION: LEFT

## 2025-02-05 ASSESSMENT — PAIN - FUNCTIONAL ASSESSMENT: PAIN_FUNCTIONAL_ASSESSMENT: 0-10

## 2025-02-05 ASSESSMENT — PAIN DESCRIPTION - DESCRIPTORS: DESCRIPTORS: TIGHTNESS

## 2025-02-05 NOTE — PROGRESS NOTES
Acute Injury Established Patient Visit    HPI: Isak is a 29 y.o.male who presents today for follow-up of his left posterior malleolus nondisplaced fracture of the left tibia.  States that overall the ankle is feeling better, but over the past 1 to 2 days, he has been experiencing severe pain into the posterior calf.  He denies any interval falls or injuries.  He denies putting any weight on the left leg.  He has been using crutches to get around.  He has some mild swelling into the toes and foot.  He denies any numbness tingling.  Denies any knee pain.    01/20/2025, he presented with new complaints of left ankle injury.  Unfortunately, last night he slipped on the ice and rolled his ankle.  He was seen at the [facility] emergency department.  Had a CT of the ankle which showed an isolated posterior malleolus fracture that is nondisplaced.  Denies any knee pain or swelling.  Denies any numbness tingling.  Was placed in a posterior splint and on crutches.    Plan: Today, on 2/5/2025, we can transition him into a tall boot, protected weightbearing, trying to minimize weightbearing to 50% or less with his crutches, but staying off of it is best as he can, coming out of the boot for range of motion exercises, ice, and elevation.  Additionally, we will obtain a stat duplex ultrasound of the left lower extremity for exquisite tenderness into the posterior calf at the muscle bellies of the gastroc and soleus muscles as well as a positive Homans' sign, but no significant swelling, erythema, warmth, or bruising.  Importantly, Achilles seems to be intact with a negative Peng's test and no tenderness at the distal attachment at the calcaneus.  Will follow-up with the results of the duplex ultrasound as soon as they are available.  Follow-up in 2 weeks with repeat x-rays of the left ankle.  As long as alignment is maintained and continues to show signs of healing, we will continue to progress weightbearing in the boot  going forward.    ADDENDUM:  After patient had his stat duplex ultrasound performed in the department next-door, and was found to have a DVT, discussed the findings with him and encouraged him to go to the emergency department right away for further evaluation and management.  He denies any current chest pain, trouble breathing, palpitations, or any other symptoms outside of this leg pain into the calf.  He understands and agrees with the plan that he will start with further evaluation in the emergency department and then follow-up closely with his primary care physician for management of this acute DVT.  Addressed all questions and concerns.  Follow-up as indicated for his fracture in 2 weeks.    01/20/2025, for this left posterior malleolus nondisplaced fracture of the left tibia, we will place him in a short leg cast to be nonweightbearing for 2 weeks, and follow-up at that time with repeat x-rays of the left ankle.  As long as he is healing well, can likely transition him into a boot at that time for some advancement of weightbearing, protected.  Continue with conservative treat measures especially elevation, but most importantly nonweightbearing status for the next 2 weeks.  He understands and agrees with plan.    Assessment:   Problem List Items Addressed This Visit    None  Visit Diagnoses       Closed fracture of posterior malleolus of left tibia, initial encounter    -  Primary    Relevant Orders    XR ankle left 3+ views    Lower extremity venous duplex left    Walking boot    Pain of left calf        Relevant Orders    Lower extremity venous duplex left    Walking boot            Diagnostics: Reviewed all relevant imaging including x-ray, MRI, CT, and US.      Procedure:  Procedures    Physical Exam:  GENERAL:  No obvious acute distress.  NEURO:  Distally neurovascularly intact.  Sensation intact to light touch.  Extremity: Left ankle exam shows:  Skin is intact;  No erythema or warmth;  Mild amount of  swelling about the ankle with no significant bruising;  No clinical signs of infection;  No pain over the lateral malleolus;  Mildly TENDER over the medial malleolus;  No pain over the ATF, CF or PTF ligaments;  No pain over the deltoid ligament;  NO PAIN over the Achilles tendon;  NEGATIVE Peng's test;  Negative squeeze test;  Negative anterior drawer test;  Negative talar tilt test;  No pain over the anterior process of the talus;  No pain over the talar dome;  No pain over the base of the fifth metatarsal bone;  No pain over the calcaneus;  No pain over the plantar aponeurosis;  No pain of the midfoot; and  Neurovascularly intact.    Exquisitely TENDER into the posterior calf into the muscle belly of the gastrocnemius and soleus muscles with a positive Homans' sign, but no swelling, erythema, warmth, or bruising.    Orders Placed This Encounter    Walking boot    XR ankle left 3+ views    Lower extremity venous duplex left      At the conclusion of the visit there were no further questions by the patient/family regarding their plan of care.  Patient was instructed to call or return with any issues, questions, or concerns regarding their injury and/or treatment plan described above.     02/05/25 at 10:01 AM - Tal Lomas DO    Office: [phone]    This note was prepared using voice recognition software.  The details of this note are correct and have been reviewed, and corrected to the best of my ability.  Some grammatical errors may persist related to the Dragon software.

## 2025-02-05 NOTE — Clinical Note
Isak Tong was seen and treated in our emergency department on 2/5/2025.  He may return to work on 02/19/2025.       If you have any questions or concerns, please don't hesitate to call.      Lorene Tsang MD

## 2025-02-05 NOTE — ED PROCEDURE NOTE
Procedure  Critical Care    Performed by: Lorene Tsang MD  Authorized by: Lorene Tsang MD    Critical care provider statement:     Critical care time (minutes):  32    Critical care time was exclusive of:  Separately billable procedures and treating other patients    Critical care was necessary to treat or prevent imminent or life-threatening deterioration of the following conditions:  Circulatory failure    Critical care was time spent personally by me on the following activities:  Ordering and performing treatments and interventions, ordering and review of laboratory studies, ordering and review of radiographic studies, pulse oximetry, re-evaluation of patient's condition, discussions with consultants, evaluation of patient's response to treatment and examination of patient               Lorene Tsang MD  02/05/25 2408

## 2025-02-05 NOTE — ED PROVIDER NOTES
HPI   Chief Complaint   Patient presents with    Chest Pain    Shortness of Breath    Leg Pain     Pt states he had an ultrasound done and found a DVT in his left leg.       HPI: 29-year-old male presents with left calf pain and swelling as well as chest pain.  He states that he broke his leg when he rolled it on ice.  He states he has been 2 weeks in a cast and then today went and saw the orthopedic doctor and they switched him over to a walking boot.  He states he told him that his calf was very tender.  They sent her for an ultrasound.  He states he went to the ultrasound and was sent here because they told him he had a blood clot.  He states he is also been having some chest pain and shortness of breath.  He states that that started yesterday.  He is not on any blood thinners.  He does not wear oxygen.  No history of DVT or PE.    Family HX: Denies any significant/pertinent family history.  Social Hx: Denies ETOH or drug use.  Review of systems:  Gen.: No weight loss, fatigue, anorexia, insomnia, fever.   Eyes: No vision loss, double vision, drainage, eye pain.   ENT: No pharyngitis, dry mouth.   Cardiac: No  syncope, near syncope.   Pulmonary: No hemoptysis.   Heme/lymph: No swollen glands, fever, bleeding.   GI: No abdominal pain, change in bowel habits, melena, hematemesis, hematochezia, nausea, vomiting, diarrhea.   : No discharge, dysuria, frequency, urgency, hematuria.   Musculoskeletal: Left leg pain   skin: No rashes.   Psych: No depression, anxiety, suicidality, homicidality.   Review of systems is otherwise negative unless stated above or in history of present illness.    Physical Exam:    Appearance: Alert, oriented , cooperative,  in no acute distress. Well nourished & well hydrated.    Skin: Intact,  dry skin, no lesions, rash, petechiae or purpura.     Eyes: PERRLA, EOMs intact,  Conjunctiva pink with no redness or exudates. Eyelids without lesions. No scleral icterus.     ENT: Hearing grossly  intact. External auditory canals patent, tympanic membranes intact with visible landmarks. Nares patent, mucus membranes moist. Dentition without lesions. Pharynx clear, uvula midline.     Neck: Supple, without meningismus. Thyroid not palpable. Trachea at midline. No lymphadenopathy.    Pulmonary: Clear bilaterally with good chest wall excursion. No rales, rhonchi or wheezing. No accessory muscle use or stridor.    Cardiac: Normal S1, S2 without murmur, rub, gallop or extrasystole. No JVD, Carotids without bruits.    Abdomen: Soft, nontender, active bowel sounds.  No palpable organomegaly.  No rebound or guarding.  No CVA tenderness.    Genitourinary: Exam deferred.    Musculoskeletal: Full range of motion. no pain, edema, or deformity. Pulses full and equal. No cyanosis, clubbing, or edema.  Lower extremity in a walking boot.  Pulses are palpable.  He does have calf tenderness    Neurological:  Cranial nerves II through XII are grossly intact, finger-nose touch is normal, normal sensation, no weakness, no focal findings identified.    Psychiatric: Appropriate mood and affect.     Medical Decision-Making:  Testing: EKG was obtained which is interpreted by me shows a sinus rhythm rate of 83 there is a rightward axis with some T wave inversions.  No evidence of obvious ST elevations.  Shailesh EKG shows a sinus rhythm rate of 74 the right axis is improved from previous interpreted by me.  Given that the patient was recently diagnosed with a DVT is not anticoagulation and is having chest pain and shortness of breath I was concerned that he also had a PE.  Therefore he went emergently to the CT scanner.  He was already empirically started on heparin here.  CT chest was negative for PE.  I spoke to the hospitalist Dr. Alex Luna.  She is recommended that the patient be started on Eliquis since this was a provoked DVT and he is young.  He is hemodynamically stable.  He is not hypoxic.  He is not tachycardic or tachypneic.  She  recommended that we do a starter pack and that the patient can be discharged home.  The patient is in agreement with this plan.  Treatment:   Reevaluation:   Plan: Homegoing. Discussed differential. Will follow-up with the primary physician in the next 2-3 days. Return if worse. They understand return precautions and discharge instructions. Patient and family/friend/caregiver are in agreement with this plan.   Impression:   1. dvt  2.  Labs Reviewed  COMPREHENSIVE METABOLIC PANEL - Normal     Glucose                       91                     Sodium                        138                    Potassium                     3.9                    Chloride                      102                    Bicarbonate                   28                     Anion Gap                     12                     Urea Nitrogen                 17                     Creatinine                    0.85                   eGFR                          >90                    Calcium                       9.9                    Albumin                       4.9                    Alkaline Phosphatase          62                     Total Protein                 7.7                    AST                           21                     Bilirubin, Total              0.5                    ALT                           29                  MAGNESIUM - Normal     Magnesium                     1.90                LACTATE - Normal     Lactate                       1.1                      Narrative: Venipuncture immediately after or during the administration of Metamizole may lead to falsely low results. Testing should be performed immediately prior to Metamizole dosing.  PROTIME-INR - Normal     Protime                       12.2                   INR                           1.1                 B-TYPE NATRIURETIC PEPTIDE - Normal     BNP                           14                       Narrative:    <100 pg/mL - Heart failure  unlikely                100-299 pg/mL - Intermediate probability of acute heart                                failure exacerbation. Correlate with clinical                                context and patient history.                  >=300 pg/mL - Heart Failure likely. Correlate with clinical                                context and patient history.                                BNP testing is performed using different testing methodology at [facility] than at other [facility]. Direct result comparisons should only be made within the same method.                   SERIAL TROPONIN-INITIAL - Normal     Troponin I, High Sensitivity   3                        Narrative: Less than 99th percentile of normal range cutoff-                Female and children under 18 years old <14 ng/L; Male <21 ng/L: Negative                Repeat testing should be performed if clinically indicated.                                 Female and children under 18 years old 14-50 ng/L; Male 21-50 ng/L:                Consistent with possible cardiac damage and possible increased clinical                 risk. Serial measurements may help to assess extent of myocardial damage.                                 >50 ng/L: Consistent with cardiac damage, increased clinical risk and                myocardial infarction. Serial measurements may help assess extent of                 myocardial damage.                                  NOTE: Children less than 1 year old may have higher baseline troponin                 levels and results should be interpreted in conjunction with the overall                 clinical context.                                 NOTE: Troponin I testing is performed using a different                 testing methodology at [facility] than at other                 [facility]. Direct result comparisons should only                 be made within the same method.  APTT - Normal     aPTT                           35                       Narrative: The APTT is no longer used for monitoring Unfractionated Heparin Therapy. For monitoring Heparin Therapy, use the Heparin Assay.  SERIAL TROPONIN, 1 HOUR - Normal     Troponin I, High Sensitivity   3                        Narrative: Less than 99th percentile of normal range cutoff-                Female and children under 18 years old <14 ng/L; Male <21 ng/L: Negative                Repeat testing should be performed if clinically indicated.                                 Female and children under 18 years old 14-50 ng/L; Male 21-50 ng/L:                Consistent with possible cardiac damage and possible increased clinical                 risk. Serial measurements may help to assess extent of myocardial damage.                                 >50 ng/L: Consistent with cardiac damage, increased clinical risk and                myocardial infarction. Serial measurements may help assess extent of                 myocardial damage.                                  NOTE: Children less than 1 year old may have higher baseline troponin                 levels and results should be interpreted in conjunction with the overall                 clinical context.                                 NOTE: Troponin I testing is performed using a different                 testing methodology at [facility] than at other                 [facility]. Direct result comparisons should only                 be made within the same method.  TROPONIN SERIES- (INITIAL, 1 HR)       Narrative: The following orders were created for panel order Troponin I Series, High Sensitivity (0, 1 HR).                Procedure                               Abnormality         Status                                   ---------                               -----------         ------                                   Troponin I, High Sensiti...[[id number]]  Normal              Final  result                             Troponin, High Sensitivi...[[id number]]  Normal              Final result                                             Please view results for these tests on the individual orders.  CBC WITH AUTO DIFFERENTIAL     CT angio chest for pulmonary embolism   Final Result    1.  No evidence of pulmonary embolism.                      MACRO:    None.          Signed by: Elvin William 2/5/2025 2:42 PM    Dictation workstation:   [id number]     XR chest 1 view   Final Result    Normal chest.                MACRO:    None          Signed by: Erwin Dubon 2/5/2025 12:36 PM    Dictation workstation:   [id number]                      Patient History   History reviewed. No pertinent past medical history.  History reviewed. No pertinent surgical history.  No family history on file.  Social History     Tobacco Use    Smoking status: Never    Smokeless tobacco: Never   Substance Use Topics    Alcohol use: Not on file    Drug use: Not on file       Physical Exam   ED Triage Vitals [02/05/25 1205]   Temperature Heart Rate Respirations BP   36.5 °C (97.7 °F) 79 18 127/66      Pulse Ox Temp src Heart Rate Source Patient Position   98 % -- -- --      BP Location FiO2 (%)     -- --       Physical Exam      ED Course & MDM   Diagnoses as of 02/05/25 1419   Acute deep vein thrombosis (DVT) of proximal vein of left lower extremity (Multi)                 No data recorded                                 Medical Decision Making      Procedure  Procedures     Lorene Tsang MD  02/05/25 9880

## 2025-02-20 ENCOUNTER — APPOINTMENT (OUTPATIENT)
Dept: ORTHOPEDIC SURGERY | Facility: CLINIC | Age: 30
End: 2025-02-20
Payer: MEDICAID

## 2025-02-20 ENCOUNTER — HOSPITAL ENCOUNTER (OUTPATIENT)
Dept: RADIOLOGY | Facility: HOSPITAL | Age: 30
Discharge: HOME | End: 2025-02-20
Payer: MEDICAID

## 2025-02-20 DIAGNOSIS — S82.392A CLOSED FRACTURE OF POSTERIOR MALLEOLUS OF LEFT TIBIA, INITIAL ENCOUNTER: ICD-10-CM

## 2025-02-20 DIAGNOSIS — S82.392A CLOSED FRACTURE OF POSTERIOR MALLEOLUS OF LEFT TIBIA, INITIAL ENCOUNTER: Primary | ICD-10-CM

## 2025-02-20 PROCEDURE — 1036F TOBACCO NON-USER: CPT | Performed by: STUDENT IN AN ORGANIZED HEALTH CARE EDUCATION/TRAINING PROGRAM

## 2025-02-20 PROCEDURE — 99024 POSTOP FOLLOW-UP VISIT: CPT | Performed by: STUDENT IN AN ORGANIZED HEALTH CARE EDUCATION/TRAINING PROGRAM

## 2025-02-20 PROCEDURE — 73610 X-RAY EXAM OF ANKLE: CPT | Mod: LT

## 2025-02-20 NOTE — PROGRESS NOTES
Acute Injury Established Patient Visit    HPI: Isak is a 29 y.o.male who presents today for follow-up of his left posterior malleolus nondisplaced fracture.  As discussed previously, he was found to have an acute DVT of the left lower extremity, and has since been placed on Eliquis for at least 3 months by his primary care physician who continues to manage his.  He was properly evaluated in the emergency department found not to have any acute pulmonary embolism.  States that he is feeling better as far as the calf swelling and pain into the calf.  However, he unfortunately has not been wearing his boot for the past 3 days and has been back in his normal shoes walking around with no protected weightbearing.  He denies any interval falls or injuries.  He does have 6 out of 10 pain.    On 2/5/2025, for follow-up of his left posterior malleolus nondisplaced fracture of the left tibia.  States that overall the ankle is feeling better, but over the past 1 to 2 days, he has been experiencing severe pain into the posterior calf.  He denies any interval falls or injuries.  He denies putting any weight on the left leg.  He has been using crutches to get around.  He has some mild swelling into the toes and foot.  He denies any numbness tingling.  Denies any knee pain.    01/20/2025, he presented with new complaints of left ankle injury.  Unfortunately, last night he slipped on the ice and rolled his ankle.  He was seen at the [facility] emergency department.  Had a CT of the ankle which showed an isolated posterior malleolus fracture that is nondisplaced.  Denies any knee pain or swelling.  Denies any numbness tingling.  Was placed in a posterior splint and on crutches.    Plan: Today, on 2/20/2025, we will have him return to his boot after I discussed with him the importance of maintaining his immobilization for the duration of treatment until he sees us back in approximately 2 weeks that I want him weightbearing as  tolerated in the boot, coming out for shower, skin care, rest, range of motion, and sleep only.  He should be in the boot anytime he is weightbearing.  Follow-up in 2 weeks with repeat x-rays of the left ankle.  Continue management of his DVT with the PCP.    On 2/5/2025, we can transition him into a tall boot, protected weightbearing, trying to minimize weightbearing to 50% or less with his crutches, but staying off of it is best as he can, coming out of the boot for range of motion exercises, ice, and elevation.  Additionally, we will obtain a stat duplex ultrasound of the left lower extremity for exquisite tenderness into the posterior calf at the muscle bellies of the gastroc and soleus muscles as well as a positive Homans' sign, but no significant swelling, erythema, warmth, or bruising.  Importantly, Achilles seems to be intact with a negative Peng's test and no tenderness at the distal attachment at the calcaneus.  Will follow-up with the results of the duplex ultrasound as soon as they are available.  Follow-up in 2 weeks with repeat x-rays of the left ankle.  As long as alignment is maintained and continues to show signs of healing, we will continue to progress weightbearing in the boot going forward.    ADDENDUM:  After patient had his stat duplex ultrasound performed in the department next-door, and was found to have a DVT, discussed the findings with him and encouraged him to go to the emergency department right away for further evaluation and management.  He denies any current chest pain, trouble breathing, palpitations, or any other symptoms outside of this leg pain into the calf.  He understands and agrees with the plan that he will start with further evaluation in the emergency department and then follow-up closely with his primary care physician for management of this acute DVT.  Addressed all questions and concerns.  Follow-up as indicated for his fracture in 2 weeks.    01/20/2025, for this  left posterior malleolus nondisplaced fracture of the left tibia, we will place him in a short leg cast to be nonweightbearing for 2 weeks, and follow-up at that time with repeat x-rays of the left ankle.  As long as he is healing well, can likely transition him into a boot at that time for some advancement of weightbearing, protected.  Continue with conservative treat measures especially elevation, but most importantly nonweightbearing status for the next 2 weeks.  He understands and agrees with plan.    Assessment:   Problem List Items Addressed This Visit    None  Visit Diagnoses       Closed fracture of posterior malleolus of left tibia, initial encounter    -  Primary    Relevant Orders    XR ankle left 3+ views            Diagnostics: Reviewed all relevant imaging including x-ray, MRI, CT, and US.      Procedure:  Procedures    Physical Exam:  GENERAL:  No obvious acute distress.  NEURO:  Distally neurovascularly intact.  Sensation intact to light touch.  Extremity: Left ankle exam shows:  Skin is intact;  No erythema or warmth;  Mild amount of swelling about the ankle with no significant bruising;  No clinical signs of infection;  No pain over the lateral malleolus;  Mildly TENDER over the medial malleolus;  No pain over the ATF, CF or PTF ligaments;  No pain over the deltoid ligament;  NO PAIN over the Achilles tendon;  NEGATIVE Peng's test;  Negative squeeze test;  Negative anterior drawer test;  Negative talar tilt test;  No pain over the anterior process of the talus;  No pain over the talar dome;  No pain over the base of the fifth metatarsal bone;  No pain over the calcaneus;  No pain over the plantar aponeurosis;  No pain of the midfoot; and  Neurovascularly intact.    Exquisitely TENDER into the posterior calf into the muscle belly of the gastrocnemius and soleus muscles with a positive Homans' sign, but no swelling, erythema, warmth, or bruising.    Orders Placed This Encounter    XR ankle left  3+ views      At the conclusion of the visit there were no further questions by the patient/family regarding their plan of care.  Patient was instructed to call or return with any issues, questions, or concerns regarding their injury and/or treatment plan described above.     02/20/25 at 3:05 PM - Tal Lomas DO    Office: [phone]    This note was prepared using voice recognition software.  The details of this note are correct and have been reviewed, and corrected to the best of my ability.  Some grammatical errors may persist related to the Dragon software.

## 2025-02-26 LAB
ATRIAL RATE: 74 BPM
ATRIAL RATE: 83 BPM
P AXIS: 55 DEGREES
P AXIS: 65 DEGREES
P OFFSET: 194 MS
P OFFSET: 200 MS
P ONSET: 137 MS
P ONSET: 147 MS
PR INTERVAL: 140 MS
PR INTERVAL: 158 MS
Q ONSET: 216 MS
Q ONSET: 217 MS
QRS COUNT: 13 BEATS
QRS COUNT: 13 BEATS
QRS DURATION: 98 MS
QRS DURATION: 98 MS
QT INTERVAL: 360 MS
QT INTERVAL: 374 MS
QTC CALCULATION(BAZETT): 415 MS
QTC CALCULATION(BAZETT): 423 MS
QTC FREDERICIA: 401 MS
QTC FREDERICIA: 401 MS
R AXIS: 72 DEGREES
R AXIS: 97 DEGREES
T AXIS: -4 DEGREES
T AXIS: 53 DEGREES
T OFFSET: 397 MS
T OFFSET: 403 MS
VENTRICULAR RATE: 74 BPM
VENTRICULAR RATE: 83 BPM

## 2025-03-06 ENCOUNTER — HOSPITAL ENCOUNTER (OUTPATIENT)
Dept: RADIOLOGY | Facility: HOSPITAL | Age: 30
Discharge: HOME | End: 2025-03-06
Payer: MEDICAID

## 2025-03-06 ENCOUNTER — OFFICE VISIT (OUTPATIENT)
Dept: ORTHOPEDIC SURGERY | Facility: CLINIC | Age: 30
End: 2025-03-06
Payer: MEDICAID

## 2025-03-06 ENCOUNTER — APPOINTMENT (OUTPATIENT)
Dept: PRIMARY CARE | Facility: CLINIC | Age: 30
End: 2025-03-06
Payer: MEDICAID

## 2025-03-06 DIAGNOSIS — S82.392A CLOSED FRACTURE OF POSTERIOR MALLEOLUS OF LEFT TIBIA, INITIAL ENCOUNTER: ICD-10-CM

## 2025-03-06 DIAGNOSIS — S82.392A CLOSED FRACTURE OF POSTERIOR MALLEOLUS OF LEFT TIBIA, INITIAL ENCOUNTER: Primary | ICD-10-CM

## 2025-03-06 PROCEDURE — 99024 POSTOP FOLLOW-UP VISIT: CPT | Performed by: STUDENT IN AN ORGANIZED HEALTH CARE EDUCATION/TRAINING PROGRAM

## 2025-03-06 PROCEDURE — L1902 AFO ANKLE GAUNTLET PRE OTS: HCPCS | Performed by: STUDENT IN AN ORGANIZED HEALTH CARE EDUCATION/TRAINING PROGRAM

## 2025-03-06 PROCEDURE — 73610 X-RAY EXAM OF ANKLE: CPT | Mod: LT

## 2025-03-06 NOTE — PROGRESS NOTES
Acute Injury Established Patient Visit    HPI: Isak is a 29 y.o.male who presents today for follow-up of his left posterior malleolus nondisplaced fracture.  States that the pain is vastly improved.  He has been consistently wearing his boot, only coming out at home for minimal amount of walking.  He denies any interval falls or injuries.  He still has some mild discomfort into the posterior calf.  He is still taking his Eliquis.  He has follow-up for the blood clot in May.  No worsening pain or swelling at this time.    On 2/20/2025, he presented for follow-up of his left posterior malleolus nondisplaced fracture.  As discussed previously, he was found to have an acute DVT of the left lower extremity, and has since been placed on Eliquis for at least 3 months by his primary care physician who continues to manage his.  He was properly evaluated in the emergency department found not to have any acute pulmonary embolism.  States that he is feeling better as far as the calf swelling and pain into the calf.  However, he unfortunately has not been wearing his boot for the past 3 days and has been back in his normal shoes walking around with no protected weightbearing.  He denies any interval falls or injuries.  He does have 6 out of 10 pain.    On 2/5/2025, for follow-up of his left posterior malleolus nondisplaced fracture of the left tibia.  States that overall the ankle is feeling better, but over the past 1 to 2 days, he has been experiencing severe pain into the posterior calf.  He denies any interval falls or injuries.  He denies putting any weight on the left leg.  He has been using crutches to get around.  He has some mild swelling into the toes and foot.  He denies any numbness tingling.  Denies any knee pain.    01/20/2025, he presented with new complaints of left ankle injury.  Unfortunately, last night he slipped on the ice and rolled his ankle.  He was seen at the [facility] emergency department.  Had a CT  of the ankle which showed an isolated posterior malleolus fracture that is nondisplaced.  Denies any knee pain or swelling.  Denies any numbness tingling.  Was placed in a posterior splint and on crutches.    Plan: Today, on 3/6/2025, we will have him come out of the boot and use a lace up ankle brace for activity only.  Normal walking does not need any support.  He will progressively return to his activities as tolerated in the lace up brace.  Follow-up in approximately 3 weeks.  No x-rays necessary unless he is not feeling better.    On 2/20/2025, we will have him return to his boot after I discussed with him the importance of maintaining his immobilization for the duration of treatment until he sees us back in approximately 2 weeks that I want him weightbearing as tolerated in the boot, coming out for shower, skin care, rest, range of motion, and sleep only.  He should be in the boot anytime he is weightbearing.  Follow-up in 2 weeks with repeat x-rays of the left ankle.  Continue management of his DVT with the PCP.    On 2/5/2025, we can transition him into a tall boot, protected weightbearing, trying to minimize weightbearing to 50% or less with his crutches, but staying off of it is best as he can, coming out of the boot for range of motion exercises, ice, and elevation.  Additionally, we will obtain a stat duplex ultrasound of the left lower extremity for exquisite tenderness into the posterior calf at the muscle bellies of the gastroc and soleus muscles as well as a positive Homans' sign, but no significant swelling, erythema, warmth, or bruising.  Importantly, Achilles seems to be intact with a negative Peng's test and no tenderness at the distal attachment at the calcaneus.  Will follow-up with the results of the duplex ultrasound as soon as they are available.  Follow-up in 2 weeks with repeat x-rays of the left ankle.  As long as alignment is maintained and continues to show signs of healing, we will  continue to progress weightbearing in the boot going forward.    ADDENDUM:  After patient had his stat duplex ultrasound performed in the department next-door, and was found to have a DVT, discussed the findings with him and encouraged him to go to the emergency department right away for further evaluation and management.  He denies any current chest pain, trouble breathing, palpitations, or any other symptoms outside of this leg pain into the calf.  He understands and agrees with the plan that he will start with further evaluation in the emergency department and then follow-up closely with his primary care physician for management of this acute DVT.  Addressed all questions and concerns.  Follow-up as indicated for his fracture in 2 weeks.    01/20/2025, for this left posterior malleolus nondisplaced fracture of the left tibia, we will place him in a short leg cast to be nonweightbearing for 2 weeks, and follow-up at that time with repeat x-rays of the left ankle.  As long as he is healing well, can likely transition him into a boot at that time for some advancement of weightbearing, protected.  Continue with conservative treat measures especially elevation, but most importantly nonweightbearing status for the next 2 weeks.  He understands and agrees with plan.    Assessment:   Problem List Items Addressed This Visit    None  Visit Diagnoses       Closed fracture of posterior malleolus of left tibia, initial encounter    -  Primary    Relevant Orders    XR ankle left 3+ views    Ankle Brace, Lace Up or A60            Diagnostics: Reviewed all relevant imaging including x-ray, MRI, CT, and US.      Procedure:  Procedures    Physical Exam:  GENERAL:  No obvious acute distress.  NEURO:  Distally neurovascularly intact.  Sensation intact to light touch.  Extremity: Left ankle exam shows:  Skin is intact;  No erythema or warmth;  Resolved swelling about the ankle with no significant bruising;  No clinical signs of  infection;  No pain over the lateral malleolus;  No pain today over the medial malleolus;  No pain over the ATF, CF or PTF ligaments;  No pain over the deltoid ligament;  NO PAIN over the Achilles tendon;  NEGATIVE Peng's test;  Negative squeeze test;  Negative anterior drawer test;  Negative talar tilt test;  No pain over the anterior process of the talus;  No pain over the talar dome;  No pain over the base of the fifth metatarsal bone;  No pain over the calcaneus;  No pain over the plantar aponeurosis;  No pain of the midfoot; and  Neurovascularly intact.    Exquisitely TENDER into the posterior calf into the muscle belly of the gastrocnemius and soleus muscles with a positive Homans' sign, but no swelling, erythema, warmth, or bruising.    Orders Placed This Encounter    Ankle Brace, Lace Up or A60    XR ankle left 3+ views      At the conclusion of the visit there were no further questions by the patient/family regarding their plan of care.  Patient was instructed to call or return with any issues, questions, or concerns regarding their injury and/or treatment plan described above.     03/06/25 at 2:38 PM - Tal Lomas,     Office: [phone]    This note was prepared using voice recognition software.  The details of this note are correct and have been reviewed, and corrected to the best of my ability.  Some grammatical errors may persist related to the Dragon software.

## 2025-04-04 ENCOUNTER — APPOINTMENT (OUTPATIENT)
Dept: ORTHOPEDIC SURGERY | Facility: CLINIC | Age: 30
End: 2025-04-04
Payer: MEDICAID

## 2025-04-25 PROCEDURE — 99285 EMERGENCY DEPT VISIT HI MDM: CPT | Performed by: STUDENT IN AN ORGANIZED HEALTH CARE EDUCATION/TRAINING PROGRAM

## 2025-04-25 PROCEDURE — 99285 EMERGENCY DEPT VISIT HI MDM: CPT | Mod: 25

## 2025-04-25 RX ORDER — ESCITALOPRAM OXALATE 5 MG/1
5 TABLET ORAL
COMMUNITY
Start: 2025-03-20

## 2025-04-25 RX ORDER — ASPIRIN 325 MG
50000 TABLET, DELAYED RELEASE (ENTERIC COATED) ORAL WEEKLY
COMMUNITY
Start: 2025-03-21

## 2025-04-25 RX ORDER — APIXABAN 5 MG/1
1 TABLET, FILM COATED ORAL
COMMUNITY
Start: 2025-02-06

## 2025-04-25 ASSESSMENT — PAIN DESCRIPTION - ORIENTATION: ORIENTATION: LEFT

## 2025-04-25 ASSESSMENT — PAIN SCALES - GENERAL: PAINLEVEL_OUTOF10: 9

## 2025-04-25 ASSESSMENT — PAIN DESCRIPTION - PAIN TYPE: TYPE: ACUTE PAIN

## 2025-04-25 ASSESSMENT — PAIN - FUNCTIONAL ASSESSMENT: PAIN_FUNCTIONAL_ASSESSMENT: 0-10

## 2025-04-26 ENCOUNTER — HOSPITAL ENCOUNTER (EMERGENCY)
Facility: HOSPITAL | Age: 30
Discharge: HOME | End: 2025-04-26
Attending: STUDENT IN AN ORGANIZED HEALTH CARE EDUCATION/TRAINING PROGRAM
Payer: MEDICAID

## 2025-04-26 ENCOUNTER — APPOINTMENT (OUTPATIENT)
Dept: CARDIOLOGY | Facility: HOSPITAL | Age: 30
End: 2025-04-26
Payer: MEDICAID

## 2025-04-26 ENCOUNTER — APPOINTMENT (OUTPATIENT)
Dept: RADIOLOGY | Facility: HOSPITAL | Age: 30
End: 2025-04-26
Payer: MEDICAID

## 2025-04-26 VITALS
OXYGEN SATURATION: 95 % | SYSTOLIC BLOOD PRESSURE: 113 MMHG | BODY MASS INDEX: 24.91 KG/M2 | HEIGHT: 66 IN | WEIGHT: 155 LBS | DIASTOLIC BLOOD PRESSURE: 66 MMHG | TEMPERATURE: 97.9 F | RESPIRATION RATE: 14 BRPM | HEART RATE: 77 BPM

## 2025-04-26 DIAGNOSIS — M79.89 LEG SWELLING: ICD-10-CM

## 2025-04-26 DIAGNOSIS — R07.9 CHEST PAIN, UNSPECIFIED TYPE: Primary | ICD-10-CM

## 2025-04-26 DIAGNOSIS — R51.9 ACUTE NONINTRACTABLE HEADACHE, UNSPECIFIED HEADACHE TYPE: ICD-10-CM

## 2025-04-26 DIAGNOSIS — R20.2 PARESTHESIA: ICD-10-CM

## 2025-04-26 LAB
ALBUMIN SERPL BCP-MCNC: 4.8 G/DL (ref 3.4–5)
ALP SERPL-CCNC: 69 U/L (ref 33–120)
ALT SERPL W P-5'-P-CCNC: 56 U/L (ref 10–52)
ANION GAP SERPL CALC-SCNC: 12 MMOL/L (ref 10–20)
APTT PPP: 31 SECONDS (ref 26–36)
AST SERPL W P-5'-P-CCNC: 34 U/L (ref 9–39)
BASOPHILS # BLD AUTO: 0.03 X10*3/UL (ref 0–0.1)
BASOPHILS NFR BLD AUTO: 0.5 %
BILIRUB SERPL-MCNC: 0.4 MG/DL (ref 0–1.2)
BNP SERPL-MCNC: 6 PG/ML (ref 0–99)
BUN SERPL-MCNC: 15 MG/DL (ref 6–23)
CALCIUM SERPL-MCNC: 9.7 MG/DL (ref 8.6–10.3)
CARDIAC TROPONIN I PNL SERPL HS: 4 NG/L (ref 0–20)
CHLORIDE SERPL-SCNC: 104 MMOL/L (ref 98–107)
CO2 SERPL-SCNC: 27 MMOL/L (ref 21–32)
CREAT SERPL-MCNC: 0.92 MG/DL (ref 0.5–1.3)
EGFRCR SERPLBLD CKD-EPI 2021: >90 ML/MIN/1.73M*2
EOSINOPHIL # BLD AUTO: 0.21 X10*3/UL (ref 0–0.7)
EOSINOPHIL NFR BLD AUTO: 3.2 %
ERYTHROCYTE [DISTWIDTH] IN BLOOD BY AUTOMATED COUNT: 13.7 % (ref 11.5–14.5)
GLUCOSE SERPL-MCNC: 99 MG/DL (ref 74–99)
HCT VFR BLD AUTO: 44.8 % (ref 41–52)
HGB BLD-MCNC: 14.6 G/DL (ref 13.5–17.5)
HOLD SPECIMEN: NORMAL
IMM GRANULOCYTES # BLD AUTO: 0.03 X10*3/UL (ref 0–0.7)
IMM GRANULOCYTES NFR BLD AUTO: 0.5 % (ref 0–0.9)
INR PPP: 1 (ref 0.9–1.1)
LYMPHOCYTES # BLD AUTO: 2.13 X10*3/UL (ref 1.2–4.8)
LYMPHOCYTES NFR BLD AUTO: 32.3 %
MAGNESIUM SERPL-MCNC: 1.91 MG/DL (ref 1.6–2.4)
MCH RBC QN AUTO: 26.4 PG (ref 26–34)
MCHC RBC AUTO-ENTMCNC: 32.6 G/DL (ref 32–36)
MCV RBC AUTO: 81 FL (ref 80–100)
MONOCYTES # BLD AUTO: 0.78 X10*3/UL (ref 0.1–1)
MONOCYTES NFR BLD AUTO: 11.8 %
NEUTROPHILS # BLD AUTO: 3.42 X10*3/UL (ref 1.2–7.7)
NEUTROPHILS NFR BLD AUTO: 51.7 %
NRBC BLD-RTO: 0 /100 WBCS (ref 0–0)
PLATELET # BLD AUTO: 224 X10*3/UL (ref 150–450)
POTASSIUM SERPL-SCNC: 4 MMOL/L (ref 3.5–5.3)
PROT SERPL-MCNC: 7.5 G/DL (ref 6.4–8.2)
PROTHROMBIN TIME: 11 SECONDS (ref 9.8–12.4)
RBC # BLD AUTO: 5.54 X10*6/UL (ref 4.5–5.9)
SODIUM SERPL-SCNC: 139 MMOL/L (ref 136–145)
WBC # BLD AUTO: 6.6 X10*3/UL (ref 4.4–11.3)

## 2025-04-26 PROCEDURE — 72125 CT NECK SPINE W/O DYE: CPT | Performed by: STUDENT IN AN ORGANIZED HEALTH CARE EDUCATION/TRAINING PROGRAM

## 2025-04-26 PROCEDURE — 83880 ASSAY OF NATRIURETIC PEPTIDE: CPT

## 2025-04-26 PROCEDURE — 93005 ELECTROCARDIOGRAM TRACING: CPT

## 2025-04-26 PROCEDURE — 2500000004 HC RX 250 GENERAL PHARMACY W/ HCPCS (ALT 636 FOR OP/ED): Mod: JZ

## 2025-04-26 PROCEDURE — 96361 HYDRATE IV INFUSION ADD-ON: CPT

## 2025-04-26 PROCEDURE — 72125 CT NECK SPINE W/O DYE: CPT

## 2025-04-26 PROCEDURE — 80053 COMPREHEN METABOLIC PANEL: CPT

## 2025-04-26 PROCEDURE — 70450 CT HEAD/BRAIN W/O DYE: CPT

## 2025-04-26 PROCEDURE — 71275 CT ANGIOGRAPHY CHEST: CPT

## 2025-04-26 PROCEDURE — 96374 THER/PROPH/DIAG INJ IV PUSH: CPT | Mod: 59

## 2025-04-26 PROCEDURE — 85025 COMPLETE CBC W/AUTO DIFF WBC: CPT

## 2025-04-26 PROCEDURE — 2550000001 HC RX 255 CONTRASTS: Performed by: STUDENT IN AN ORGANIZED HEALTH CARE EDUCATION/TRAINING PROGRAM

## 2025-04-26 PROCEDURE — 85610 PROTHROMBIN TIME: CPT

## 2025-04-26 PROCEDURE — 2500000001 HC RX 250 WO HCPCS SELF ADMINISTERED DRUGS (ALT 637 FOR MEDICARE OP)

## 2025-04-26 PROCEDURE — 84484 ASSAY OF TROPONIN QUANT: CPT

## 2025-04-26 PROCEDURE — 36415 COLL VENOUS BLD VENIPUNCTURE: CPT

## 2025-04-26 PROCEDURE — 71275 CT ANGIOGRAPHY CHEST: CPT | Performed by: STUDENT IN AN ORGANIZED HEALTH CARE EDUCATION/TRAINING PROGRAM

## 2025-04-26 PROCEDURE — 83735 ASSAY OF MAGNESIUM: CPT

## 2025-04-26 PROCEDURE — 70450 CT HEAD/BRAIN W/O DYE: CPT | Performed by: STUDENT IN AN ORGANIZED HEALTH CARE EDUCATION/TRAINING PROGRAM

## 2025-04-26 RX ORDER — ACETAMINOPHEN 325 MG/1
975 TABLET ORAL ONCE
Status: COMPLETED | OUTPATIENT
Start: 2025-04-26 | End: 2025-04-26

## 2025-04-26 RX ORDER — METOCLOPRAMIDE HYDROCHLORIDE 5 MG/ML
5 INJECTION INTRAMUSCULAR; INTRAVENOUS ONCE
Status: COMPLETED | OUTPATIENT
Start: 2025-04-26 | End: 2025-04-26

## 2025-04-26 RX ADMIN — METOCLOPRAMIDE 5 MG: 5 INJECTION, SOLUTION INTRAMUSCULAR; INTRAVENOUS at 01:04

## 2025-04-26 RX ADMIN — SODIUM CHLORIDE, SODIUM LACTATE, POTASSIUM CHLORIDE, AND CALCIUM CHLORIDE 1000 ML: .6; .31; .03; .02 INJECTION, SOLUTION INTRAVENOUS at 01:03

## 2025-04-26 RX ADMIN — IOHEXOL 70 ML: 350 INJECTION, SOLUTION INTRAVENOUS at 01:51

## 2025-04-26 RX ADMIN — ACETAMINOPHEN 975 MG: 325 TABLET ORAL at 01:04

## 2025-04-26 ASSESSMENT — HEART SCORE
ECG: NORMAL
RISK FACTORS: NO KNOWN RISK FACTORS
TROPONIN: LESS THAN OR EQUAL TO NORMAL LIMIT
HISTORY: SLIGHTLY SUSPICIOUS
AGE: <45
HEART SCORE: 0

## 2025-04-26 ASSESSMENT — PAIN SCALES - GENERAL: PAINLEVEL_OUTOF10: 0 - NO PAIN

## 2025-04-26 NOTE — ED PROVIDER NOTES
Emergency Department Provider Note        History of Present Illness     History provided by: Patient  Limitations to History: None  External Records Reviewed with Brief Summary: None    HPI:  Isak Tong is a 29 y.o. male with history of provoked DVT in the left lower extremity currently on Eliquis presenting for left leg pain, left-sided headache, left arm and leg paresthesias.  Patient fractured his ankle about a month ago.  He developed a DVT in his left leg and has been on Eliquis and compliant with it.  Despite this, he has been having worsening left leg pain and swelling over the last week.  He also reports intermittent chest pains for about 2 weeks but no shortness of breath.  The chest pain lasts for seconds to minutes and has no pattern to it.  He also has been having intermittent left-sided headaches, left facial paresthesias, left arm and leg paresthesias for the last week.  He has a headache with this that he describes as 7/10 at this time.  Also some photophobia with it.  His face also feels somewhat numb including his forehead, cheek, jaw only on the left side.    Physical Exam   Triage vitals:  T 36.6 °C (97.9 °F)  HR 82  /88  RR 20  O2 95 % None (Room air)    Physical Exam  Vitals and nursing note reviewed.   Constitutional:       General: He is not in acute distress.     Appearance: He is well-developed.   HENT:      Head: Normocephalic and atraumatic.      Right Ear: External ear normal.      Left Ear: External ear normal.      Nose: Nose normal.      Mouth/Throat:      Mouth: Mucous membranes are moist.   Eyes:      General: No scleral icterus.     Conjunctiva/sclera: Conjunctivae normal.      Pupils: Pupils are equal, round, and reactive to light.   Cardiovascular:      Rate and Rhythm: Normal rate and regular rhythm.      Heart sounds: No murmur heard.  Pulmonary:      Effort: Pulmonary effort is normal. No respiratory distress.      Breath sounds: Normal breath sounds.    Abdominal:      Palpations: Abdomen is soft.      Tenderness: There is no abdominal tenderness.   Musculoskeletal:         General: No swelling.      Cervical back: Neck supple. No rigidity.      Right lower leg: No edema.      Left lower leg: Edema present.   Skin:     General: Skin is warm and dry.      Capillary Refill: Capillary refill takes less than 2 seconds.      Findings: Erythema present.      Comments: Erythema of left leg.   Neurological:      General: No focal deficit present.      Mental Status: He is alert and oriented to person, place, and time. Mental status is at baseline.      Cranial Nerves: No cranial nerve deficit.      Sensory: No sensory deficit.      Motor: No weakness.      Coordination: Coordination normal.   Psychiatric:         Mood and Affect: Mood normal.          Medical Decision Making & ED Course   Medical Decision Makin y.o. male presenting for headache, left-sided paresthesias, left leg pain.  Patient is afebrile hemodynamic arrival.  He does report intermittent chest pain and has a known DVT.  He is on Eliquis for it however.  Will obtain lab work and imaging to rule out pulmonary embolism and cardiac injury.  He does report left arm and leg paresthesias but this also involves his left side his face including his forehead.  He does also report a headache and photophobia.  No neck stiffness or fever.  Lower suspicion for a central nervous system etiology such as stroke or spinal cord pathology, but will obtain CT imaging of his head and C-spine.  Given the headache, complex migraine is also within the differential.  He will be given Tylenol, Reglan, IV fluids and reevaluation.    CBC and CMP are within normal limits.  BNP and troponin within normal limits.  Magnesium normal.  CT head and C-spine negative.  CT angio of the chest is negative for acute cardiopulmonary pathology including pulmonary embolism.    Patient has improving symptoms on reevaluation.  Although he does  have paresthesias of his extremities, the involvement of the left side of his face including his forehead as well as the associated with the headache makes stroke less likely.  Etiology such as complex migraine is more likely.  He is given neurology referral.  Patient has heart score is 0.  Low suspicion for his chest pain to be cardiac in nature.  He does have some erythema and pain in his left lower extremity but does have a known DVT.  He has good pulses.  Does not appear to be significantly swollen compared to the right and clinically not phlegmasia cerulea dolens.  Patient is instructed to continue taking his Eliquis and follow-up with his primary care doctor and neurology.  Home care and return instructions discussed. Patient expressed understanding and agreement. Patient discharged in stable condition.    Efra Silva DO, PGY-4  Emergency Medicine Resident     Social Determinants of Health which Significantly Impact Care: None identified     EKG Independent Interpretation: EKG interpreted by myself. Please see ED Course for full interpretation.    Independent Result Review and Interpretation: Relevant laboratory and radiographic results were reviewed and independently interpreted by myself.  As necessary, they are commented on in the ED Course.    Chronic conditions affecting the patient's care: As documented above in [facility]    The patient was discussed with the following consultants/services: None    Care Considerations: As documented above in [facility]    ED Course:  Diagnoses as of 04/26/25 0859   Chest pain, unspecified type   Paresthesia   Acute nonintractable headache, unspecified headache type   Leg swelling     Disposition   As a result of the work-up, the patient was discharged home.  he was informed of his diagnosis and instructed to come back with any concerns or worsening of condition.  he and was agreeable to the plan as discussed above.  he was given the opportunity to ask questions.  All of the patient's  questions were answered.    Procedures   Procedures    Patient seen and discussed with ED attending physician.    Efra Silva DO  Emergency Medicine     Efra Silva DO  Resident  04/26/25 5954

## 2025-04-26 NOTE — DISCHARGE INSTRUCTIONS
Continue to take your Eliquis.  You may take Tylenol as needed for your pain.  Follow-up with your primary care doctor in 2 to 3 days regarding your chest pain.  We have also given you a referral for neurology to follow-up with regarding your numbness and your headaches.  If your chest pain worsens or you have shortness of breath, fainting episodes, dizziness, worsening of the leg swelling and pain to please return to the emergency department.

## 2025-09-05 ENCOUNTER — APPOINTMENT (OUTPATIENT)
Dept: GENERAL RADIOLOGY | Age: 30
End: 2025-09-05
Payer: COMMERCIAL

## 2025-09-05 PROCEDURE — 71046 X-RAY EXAM CHEST 2 VIEWS: CPT

## 2025-09-05 ASSESSMENT — PAIN DESCRIPTION - LOCATION: LOCATION: CHEST

## 2025-09-05 ASSESSMENT — PAIN - FUNCTIONAL ASSESSMENT: PAIN_FUNCTIONAL_ASSESSMENT: 0-10

## 2025-09-05 ASSESSMENT — PAIN SCALES - GENERAL: PAINLEVEL_OUTOF10: 10

## 2025-09-05 ASSESSMENT — PAIN DESCRIPTION - FREQUENCY: FREQUENCY: CONTINUOUS

## 2025-09-05 ASSESSMENT — LIFESTYLE VARIABLES
HOW MANY STANDARD DRINKS CONTAINING ALCOHOL DO YOU HAVE ON A TYPICAL DAY: PATIENT DOES NOT DRINK
HOW OFTEN DO YOU HAVE A DRINK CONTAINING ALCOHOL: NEVER

## 2025-09-05 ASSESSMENT — PAIN DESCRIPTION - PAIN TYPE: TYPE: ACUTE PAIN

## 2025-09-06 ENCOUNTER — HOSPITAL ENCOUNTER (EMERGENCY)
Age: 30
Discharge: HOME | End: 2025-09-06
Payer: COMMERCIAL

## 2025-09-06 VITALS
TEMPERATURE: 97.3 F | HEART RATE: 81 BPM | RESPIRATION RATE: 18 BRPM | DIASTOLIC BLOOD PRESSURE: 76 MMHG | OXYGEN SATURATION: 100 % | SYSTOLIC BLOOD PRESSURE: 109 MMHG

## 2025-09-06 DIAGNOSIS — F41.1 ANXIETY STATE: ICD-10-CM

## 2025-09-06 DIAGNOSIS — F32.A DEPRESSIVE EPISODE: Primary | ICD-10-CM

## 2025-09-06 LAB
ALBUMIN SERPL-MCNC: 4.6 G/DL (ref 3.5–4.6)
ALP SERPL-CCNC: 68 U/L (ref 35–104)
ALT SERPL-CCNC: 38 U/L (ref 0–41)
AMPHET UR QL SCN: NORMAL
ANION GAP SERPL CALCULATED.3IONS-SCNC: 14 MEQ/L (ref 9–15)
AST SERPL-CCNC: 26 U/L (ref 0–40)
B PARAP IS1001 DNA NPH QL NAA+NON-PROBE: NOT DETECTED
B PERT.PT PRMT NPH QL NAA+NON-PROBE: NOT DETECTED
BARBITURATES UR QL SCN: NORMAL
BASOPHILS # BLD: 0.1 K/UL (ref 0–0.2)
BASOPHILS NFR BLD: 0.6 %
BENZODIAZ UR QL SCN: NORMAL
BILIRUB SERPL-MCNC: 0.8 MG/DL (ref 0.2–0.7)
BILIRUB UR QL STRIP: NEGATIVE
BUN SERPL-MCNC: 13 MG/DL (ref 6–20)
C PNEUM DNA NPH QL NAA+NON-PROBE: NOT DETECTED
CALCIUM SERPL-MCNC: 9.6 MG/DL (ref 8.5–9.9)
CANNABINOIDS UR QL SCN: NORMAL
CHLORIDE SERPL-SCNC: 102 MEQ/L (ref 95–107)
CK SERPL-CCNC: 193 U/L (ref 0–190)
CLARITY UR: CLEAR
CO2 SERPL-SCNC: 25 MEQ/L (ref 20–31)
COCAINE UR QL SCN: NORMAL
COLOR UR: YELLOW
CREAT SERPL-MCNC: 0.75 MG/DL (ref 0.7–1.2)
DRUG SCREEN COMMENT UR-IMP: NORMAL
EKG ATRIAL RATE: 66 BPM
EKG DIAGNOSIS: NORMAL
EKG P AXIS: 59 DEGREES
EKG P-R INTERVAL: 144 MS
EKG Q-T INTERVAL: 402 MS
EKG QRS DURATION: 112 MS
EKG QTC CALCULATION (BAZETT): 421 MS
EKG R AXIS: 69 DEGREES
EKG T AXIS: 54 DEGREES
EKG VENTRICULAR RATE: 66 BPM
EOSINOPHIL # BLD: 0.1 K/UL (ref 0–0.7)
EOSINOPHIL NFR BLD: 1.4 %
ERYTHROCYTE [DISTWIDTH] IN BLOOD BY AUTOMATED COUNT: 13.4 % (ref 11.5–14.5)
FENTANYL SCREEN, URINE: NORMAL
FLUAV RNA NPH QL NAA+NON-PROBE: NOT DETECTED
FLUBV RNA NPH QL NAA+NON-PROBE: NOT DETECTED
GLOBULIN SER CALC-MCNC: 2.9 G/DL (ref 2.3–3.5)
GLUCOSE SERPL-MCNC: 88 MG/DL (ref 70–99)
GLUCOSE UR STRIP-MCNC: NEGATIVE MG/DL
HADV DNA NPH QL NAA+NON-PROBE: NOT DETECTED
HCOV 229E RNA NPH QL NAA+NON-PROBE: NOT DETECTED
HCOV HKU1 RNA NPH QL NAA+NON-PROBE: NOT DETECTED
HCOV NL63 RNA NPH QL NAA+NON-PROBE: NOT DETECTED
HCOV OC43 RNA NPH QL NAA+NON-PROBE: NOT DETECTED
HCT VFR BLD AUTO: 45.4 % (ref 42–52)
HGB BLD-MCNC: 14.8 G/DL (ref 14–18)
HGB UR QL STRIP: NEGATIVE
HMPV RNA NPH QL NAA+NON-PROBE: NOT DETECTED
HPIV1 RNA NPH QL NAA+NON-PROBE: NOT DETECTED
HPIV2 RNA NPH QL NAA+NON-PROBE: NOT DETECTED
HPIV3 RNA NPH QL NAA+NON-PROBE: NOT DETECTED
HPIV4 RNA NPH QL NAA+NON-PROBE: NOT DETECTED
INR PPP: 1.1
KETONES UR STRIP-MCNC: 15 MG/DL
LACTATE BLDV-SCNC: 1.2 MMOL/L (ref 0.5–2.2)
LEUKOCYTE ESTERASE UR QL STRIP: NEGATIVE
LIPASE SERPL-CCNC: 21 U/L (ref 12–95)
LYMPHOCYTES # BLD: 2.1 K/UL (ref 1–4.8)
LYMPHOCYTES NFR BLD: 25.4 %
M PNEUMO DNA NPH QL NAA+NON-PROBE: NOT DETECTED
MAGNESIUM SERPL-MCNC: 2 MG/DL (ref 1.7–2.4)
MCH RBC QN AUTO: 26.1 PG (ref 27–31.3)
MCHC RBC AUTO-ENTMCNC: 32.6 % (ref 33–37)
MCV RBC AUTO: 80.1 FL (ref 79–92.2)
METHADONE UR QL SCN: NORMAL
MONOCYTES # BLD: 0.9 K/UL (ref 0.2–0.8)
MONOCYTES NFR BLD: 10.8 %
NEUTROPHILS # BLD: 5.2 K/UL (ref 1.4–6.5)
NEUTS SEG NFR BLD: 61.2 %
NITRITE UR QL STRIP: NEGATIVE
OPIATES UR QL SCN: NORMAL
OXYCODONE UR QL SCN: NORMAL
PCP UR QL SCN: NORMAL
PH UR STRIP: 7 [PH] (ref 5–9)
PLATELET # BLD AUTO: 271 K/UL (ref 130–400)
POTASSIUM SERPL-SCNC: 3.5 MEQ/L (ref 3.4–4.9)
PROPOXYPH UR QL SCN: NORMAL
PROT SERPL-MCNC: 7.5 G/DL (ref 6.3–8)
PROT UR STRIP-MCNC: ABNORMAL MG/DL
PROTHROMBIN TIME: 14.1 SEC (ref 12.3–14.9)
RBC # BLD AUTO: 5.67 M/UL (ref 4.7–6.1)
RSV RNA NPH QL NAA+NON-PROBE: NOT DETECTED
RV+EV RNA NPH QL NAA+NON-PROBE: NOT DETECTED
SARS-COV-2 RNA NPH QL NAA+NON-PROBE: NOT DETECTED
SODIUM SERPL-SCNC: 141 MEQ/L (ref 135–144)
SP GR UR STRIP: 1.03 (ref 1–1.03)
STREP GRP A PCR: NEGATIVE
TROPONIN, HIGH SENSITIVITY: <6 NG/L (ref 0–19)
URINE REFLEX TO CULTURE: ABNORMAL
UROBILINOGEN UR STRIP-ACNC: 1 E.U./DL
WBC # BLD AUTO: 8.4 K/UL (ref 4.8–10.8)

## 2025-09-06 PROCEDURE — 6360000002 HC RX W HCPCS

## 2025-09-06 PROCEDURE — 87651 STREP A DNA AMP PROBE: CPT

## 2025-09-06 PROCEDURE — 80307 DRUG TEST PRSMV CHEM ANLYZR: CPT

## 2025-09-06 PROCEDURE — 81003 URINALYSIS AUTO W/O SCOPE: CPT

## 2025-09-06 PROCEDURE — 6370000000 HC RX 637 (ALT 250 FOR IP)

## 2025-09-06 PROCEDURE — 85025 COMPLETE CBC W/AUTO DIFF WBC: CPT

## 2025-09-06 PROCEDURE — 2580000003 HC RX 258

## 2025-09-06 PROCEDURE — 0202U NFCT DS 22 TRGT SARS-COV-2: CPT

## 2025-09-06 PROCEDURE — 83690 ASSAY OF LIPASE: CPT

## 2025-09-06 PROCEDURE — 84484 ASSAY OF TROPONIN QUANT: CPT

## 2025-09-06 PROCEDURE — 80053 COMPREHEN METABOLIC PANEL: CPT

## 2025-09-06 PROCEDURE — 83605 ASSAY OF LACTIC ACID: CPT

## 2025-09-06 PROCEDURE — 82550 ASSAY OF CK (CPK): CPT

## 2025-09-06 PROCEDURE — 85610 PROTHROMBIN TIME: CPT

## 2025-09-06 PROCEDURE — 83735 ASSAY OF MAGNESIUM: CPT

## 2025-09-06 RX ORDER — HYDROXYZINE HYDROCHLORIDE 25 MG/1
25 TABLET, FILM COATED ORAL ONCE
Status: COMPLETED | OUTPATIENT
Start: 2025-09-06 | End: 2025-09-06

## 2025-09-06 RX ORDER — 0.9 % SODIUM CHLORIDE 0.9 %
500 INTRAVENOUS SOLUTION INTRAVENOUS ONCE
Status: COMPLETED | OUTPATIENT
Start: 2025-09-06 | End: 2025-09-06

## 2025-09-06 RX ORDER — KETOROLAC TROMETHAMINE 30 MG/ML
30 INJECTION, SOLUTION INTRAMUSCULAR; INTRAVENOUS ONCE
Status: COMPLETED | OUTPATIENT
Start: 2025-09-06 | End: 2025-09-06

## 2025-09-06 RX ADMIN — SODIUM CHLORIDE 500 ML: 0.9 INJECTION, SOLUTION INTRAVENOUS at 01:41

## 2025-09-06 RX ADMIN — KETOROLAC TROMETHAMINE 30 MG: 30 INJECTION, SOLUTION INTRAMUSCULAR at 01:43

## 2025-09-06 RX ADMIN — HYDROXYZINE HYDROCHLORIDE 25 MG: 25 TABLET ORAL at 01:43

## 2025-09-06 ASSESSMENT — ENCOUNTER SYMPTOMS
COUGH: 0
ABDOMINAL PAIN: 0
SHORTNESS OF BREATH: 0
NAUSEA: 0
CHEST TIGHTNESS: 1